# Patient Record
Sex: FEMALE | Race: WHITE | NOT HISPANIC OR LATINO | URBAN - METROPOLITAN AREA
[De-identification: names, ages, dates, MRNs, and addresses within clinical notes are randomized per-mention and may not be internally consistent; named-entity substitution may affect disease eponyms.]

---

## 2017-01-03 ENCOUNTER — OFFICE (AMBULATORY)
Dept: URBAN - METROPOLITAN AREA CLINIC 75 | Facility: CLINIC | Age: 37
End: 2017-01-03

## 2017-01-03 VITALS
HEART RATE: 80 BPM | SYSTOLIC BLOOD PRESSURE: 124 MMHG | HEIGHT: 67 IN | WEIGHT: 142 LBS | DIASTOLIC BLOOD PRESSURE: 80 MMHG

## 2017-01-03 DIAGNOSIS — Z86.010 PERSONAL HISTORY OF COLONIC POLYPS: ICD-10-CM

## 2017-01-03 DIAGNOSIS — R10.31 RIGHT LOWER QUADRANT PAIN: ICD-10-CM

## 2017-01-03 DIAGNOSIS — K51.00 ULCERATIVE (CHRONIC) PANCOLITIS WITHOUT COMPLICATIONS: ICD-10-CM

## 2017-01-03 DIAGNOSIS — K21.0 GASTRO-ESOPHAGEAL REFLUX DISEASE WITH ESOPHAGITIS: ICD-10-CM

## 2017-01-03 PROCEDURE — 99213 OFFICE O/P EST LOW 20 MIN: CPT | Performed by: INTERNAL MEDICINE

## 2017-01-03 RX ORDER — CHLORDIAZEPOXIDE HYDROCHLORIDE AND CLIDINIUM BROMIDE 5; 2.5 MG/1; MG/1
10 CAPSULE ORAL
Qty: 60 | Refills: 3 | Status: COMPLETED
Start: 2017-01-03 | End: 2017-03-17

## 2017-03-16 VITALS
HEIGHT: 67 IN | SYSTOLIC BLOOD PRESSURE: 116 MMHG | HEART RATE: 76 BPM | DIASTOLIC BLOOD PRESSURE: 70 MMHG | WEIGHT: 141 LBS

## 2017-03-17 ENCOUNTER — OFFICE (AMBULATORY)
Dept: URBAN - METROPOLITAN AREA CLINIC 1 | Facility: CLINIC | Age: 37
End: 2017-03-17

## 2017-03-17 DIAGNOSIS — K30 FUNCTIONAL DYSPEPSIA: ICD-10-CM

## 2017-03-17 DIAGNOSIS — Z86.010 PERSONAL HISTORY OF COLONIC POLYPS: ICD-10-CM

## 2017-03-17 DIAGNOSIS — R10.31 RIGHT LOWER QUADRANT PAIN: ICD-10-CM

## 2017-03-17 DIAGNOSIS — K62.89 OTHER SPECIFIED DISEASES OF ANUS AND RECTUM: ICD-10-CM

## 2017-03-17 DIAGNOSIS — K51.00 ULCERATIVE (CHRONIC) PANCOLITIS WITHOUT COMPLICATIONS: ICD-10-CM

## 2017-03-17 DIAGNOSIS — K92.2 GASTROINTESTINAL HEMORRHAGE, UNSPECIFIED: ICD-10-CM

## 2017-03-17 DIAGNOSIS — K21.0 GASTRO-ESOPHAGEAL REFLUX DISEASE WITH ESOPHAGITIS: ICD-10-CM

## 2017-03-17 PROCEDURE — 99213 OFFICE O/P EST LOW 20 MIN: CPT | Performed by: INTERNAL MEDICINE

## 2017-07-10 ENCOUNTER — OFFICE VISIT (OUTPATIENT)
Dept: SPORTS MEDICINE | Facility: CLINIC | Age: 37
End: 2017-07-10

## 2017-07-10 VITALS
BODY MASS INDEX: 21.12 KG/M2 | DIASTOLIC BLOOD PRESSURE: 70 MMHG | WEIGHT: 134.6 LBS | HEART RATE: 79 BPM | RESPIRATION RATE: 16 BRPM | TEMPERATURE: 98.7 F | HEIGHT: 67 IN | OXYGEN SATURATION: 99 % | SYSTOLIC BLOOD PRESSURE: 116 MMHG

## 2017-07-10 DIAGNOSIS — J02.9 SORE THROAT: Primary | ICD-10-CM

## 2017-07-10 DIAGNOSIS — J35.8 TONSIL STONE: ICD-10-CM

## 2017-07-10 LAB
EXPIRATION DATE: NORMAL
INTERNAL CONTROL: NORMAL
Lab: NORMAL
S PYO AG THROAT QL: NEGATIVE

## 2017-07-10 PROCEDURE — 87880 STREP A ASSAY W/OPTIC: CPT | Performed by: FAMILY MEDICINE

## 2017-07-10 PROCEDURE — 99213 OFFICE O/P EST LOW 20 MIN: CPT | Performed by: FAMILY MEDICINE

## 2017-07-10 RX ORDER — IBUPROFEN 600 MG/1
600 TABLET ORAL
COMMUNITY
End: 2022-05-31

## 2017-07-10 RX ORDER — CHLORAL HYDRATE 500 MG
CAPSULE ORAL
COMMUNITY
End: 2022-05-31

## 2017-07-10 NOTE — PROGRESS NOTES
"Lavonne is a 36 y.o. year old female    Chief Complaint   Patient presents with   • Sore Throat     Sore throat since Thursday night. White spot on tonsil.        History of Present Illness   HPI Comments: White bump on L tonsil since Thurs. No fever or chills. Some ear, neck pain but thinks it is swelling. No difficulty swallowing though painful. H/o similar that typically resolve w/in 24 hrs. No sick contacts.       I have reviewed the patient's medical history in detail and updated the computerized patient record.    Review of Systems   Constitutional: Negative for fever.   HENT:        Per HPI   Skin: Negative for rash.   All other systems reviewed and are negative.      /70 (BP Location: Left arm, Patient Position: Sitting, Cuff Size: Adult)  Pulse 79  Temp 98.7 °F (37.1 °C) (Oral)   Resp 16  Ht 67\" (170.2 cm)  Wt 134 lb 9.6 oz (61.1 kg)  LMP 06/17/2017  SpO2 99%  BMI 21.08 kg/m2     Physical Exam   Constitutional: She is oriented to person, place, and time. She appears well-developed and well-nourished.   HENT:   Head: Normocephalic and atraumatic.   Right Ear: External ear normal.   Left Ear: External ear normal.   Nose: Nose normal.   Mouth/Throat: Oropharynx is clear and moist.   L tonsillar stone w/in tonsil duct that is not painful to touch   Eyes: EOM are normal.   Neck: Normal range of motion.   Cardiovascular: Normal rate and regular rhythm.    Pulmonary/Chest: Effort normal and breath sounds normal.   Neurological: She is alert and oriented to person, place, and time.   Skin: Skin is warm and dry. No rash noted.   Psychiatric: She has a normal mood and affect. Her behavior is normal.   Nursing note and vitals reviewed.       Diagnoses and all orders for this visit:    Sore throat  -     POCT rapid strep A  -     Ambulatory Referral to ENT (Otolaryngology)    Tonsil stone  -     Ambulatory Referral to ENT (Otolaryngology)    Other orders  -     ibuprofen (ADVIL,MOTRIN) 600 MG tablet; Take " 600 mg by mouth.  -     MULTIPLE VITAMIN PO; Take  by mouth.  -     Omega-3 Fatty Acids (FISH OIL) 1000 MG capsule capsule; Take  by mouth.  -     PROBIOTIC PRODUCT PO; Take  by mouth.      RST neg. Tonsil stone likely. Can try acidic candy to try and help expectorate the stone. Salt water gargles. Given h/o what sounds like recurrent stones in same tonsil, recommend ENT consult.

## 2017-11-02 VITALS
HEIGHT: 67 IN | HEART RATE: 72 BPM | DIASTOLIC BLOOD PRESSURE: 80 MMHG | SYSTOLIC BLOOD PRESSURE: 114 MMHG | WEIGHT: 140 LBS

## 2017-11-03 ENCOUNTER — OFFICE (AMBULATORY)
Dept: URBAN - METROPOLITAN AREA CLINIC 1 | Facility: CLINIC | Age: 37
End: 2017-11-03

## 2017-11-03 DIAGNOSIS — K62.89 OTHER SPECIFIED DISEASES OF ANUS AND RECTUM: ICD-10-CM

## 2017-11-03 DIAGNOSIS — Z86.010 PERSONAL HISTORY OF COLONIC POLYPS: ICD-10-CM

## 2017-11-03 DIAGNOSIS — K30 FUNCTIONAL DYSPEPSIA: ICD-10-CM

## 2017-11-03 DIAGNOSIS — K51.00 ULCERATIVE (CHRONIC) PANCOLITIS WITHOUT COMPLICATIONS: ICD-10-CM

## 2017-11-03 DIAGNOSIS — R10.31 RIGHT LOWER QUADRANT PAIN: ICD-10-CM

## 2017-11-03 DIAGNOSIS — K21.0 GASTRO-ESOPHAGEAL REFLUX DISEASE WITH ESOPHAGITIS: ICD-10-CM

## 2017-11-03 PROCEDURE — 99213 OFFICE O/P EST LOW 20 MIN: CPT | Performed by: INTERNAL MEDICINE

## 2018-02-01 ENCOUNTER — OFFICE VISIT (OUTPATIENT)
Dept: SPORTS MEDICINE | Facility: CLINIC | Age: 38
End: 2018-02-01

## 2018-02-01 VITALS
SYSTOLIC BLOOD PRESSURE: 110 MMHG | HEIGHT: 67 IN | OXYGEN SATURATION: 99 % | WEIGHT: 141 LBS | HEART RATE: 108 BPM | TEMPERATURE: 98.6 F | DIASTOLIC BLOOD PRESSURE: 80 MMHG | BODY MASS INDEX: 22.13 KG/M2

## 2018-02-01 DIAGNOSIS — R68.89 FLU-LIKE SYMPTOMS: ICD-10-CM

## 2018-02-01 DIAGNOSIS — J01.10 ACUTE NON-RECURRENT FRONTAL SINUSITIS: Primary | ICD-10-CM

## 2018-02-01 LAB
EXPIRATION DATE: NORMAL
FLUAV AG NPH QL: NEGATIVE
FLUBV AG NPH QL: NEGATIVE
INTERNAL CONTROL: NORMAL
Lab: NORMAL

## 2018-02-01 PROCEDURE — 99213 OFFICE O/P EST LOW 20 MIN: CPT | Performed by: FAMILY MEDICINE

## 2018-02-01 PROCEDURE — 87804 INFLUENZA ASSAY W/OPTIC: CPT | Performed by: FAMILY MEDICINE

## 2018-02-01 RX ORDER — DOXYCYCLINE 100 MG/1
100 CAPSULE ORAL EVERY 12 HOURS SCHEDULED
Qty: 20 CAPSULE | Refills: 0 | Status: SHIPPED | OUTPATIENT
Start: 2018-02-01 | End: 2018-02-11

## 2018-02-01 RX ORDER — IPRATROPIUM BROMIDE 42 UG/1
2 SPRAY, METERED NASAL 3 TIMES DAILY PRN
Qty: 15 ML | Refills: 2 | Status: SHIPPED | OUTPATIENT
Start: 2018-02-01 | End: 2022-05-31

## 2018-02-01 NOTE — PROGRESS NOTES
"Lavonne is a 37 y.o. year old female    Chief Complaint   Patient presents with   • Cough     since monday   • Generalized Body Aches   • Headache       History of Present Illness   Cough   This is a new problem. The current episode started 1 to 4 weeks ago. The problem has been gradually worsening. Associated symptoms include ear congestion, a fever, headaches, myalgias, nasal congestion and postnasal drip. Pertinent negatives include no chest pain, chills, rash, rhinorrhea, sore throat or shortness of breath. She has tried OTC cough suppressant for the symptoms. The treatment provided mild relief.   Headache    Associated symptoms include coughing and a fever. Pertinent negatives include no abdominal pain, numbness, rhinorrhea, sinus pressure or sore throat.        I have reviewed the patient's medical, family, and social history in detail and updated the computerized patient record.    Review of Systems   Constitutional: Positive for fever. Negative for chills and fatigue.   HENT: Positive for postnasal drip. Negative for congestion, rhinorrhea, sinus pressure and sore throat.    Respiratory: Positive for cough. Negative for chest tightness and shortness of breath.    Cardiovascular: Negative for chest pain.   Gastrointestinal: Negative for abdominal pain.   Musculoskeletal: Positive for myalgias. Negative for arthralgias.   Skin: Negative for rash.   Neurological: Positive for headaches. Negative for numbness.   All other systems reviewed and are negative.      /80  Pulse 108  Temp 98.6 °F (37 °C)  Ht 170.2 cm (67\")  Wt 64 kg (141 lb)  SpO2 99%  BMI 22.08 kg/m2     Physical Exam   Constitutional: She is oriented to person, place, and time. She appears well-developed and well-nourished. She appears ill.   HENT:   Head: Normocephalic and atraumatic.   Right Ear: Hearing, tympanic membrane and external ear normal.   Left Ear: Hearing, tympanic membrane and external ear normal.   Nose: No mucosal edema or " rhinorrhea. Right sinus exhibits maxillary sinus tenderness. Left sinus exhibits maxillary sinus tenderness.   Mouth/Throat: Oropharynx is clear and moist. No posterior oropharyngeal erythema. Tonsils are 0 on the right. Tonsils are 0 on the left. No tonsillar exudate.   Eyes: Conjunctivae and EOM are normal.   Neck: Normal range of motion.   Cardiovascular: Normal rate and normal heart sounds.    Pulmonary/Chest: Effort normal and breath sounds normal.   Lymphadenopathy:     She has no cervical adenopathy.   Neurological: She is alert and oriented to person, place, and time.   Skin: Skin is warm and dry.   Psychiatric: She has a normal mood and affect. Her behavior is normal.   Nursing note and vitals reviewed.    Lab Results   Component Value Date    RAPFLUA NEGATIVE 02/01/2018    RAPFLUB NEGATIVE 02/01/2018          Diagnoses and all orders for this visit:    Acute non-recurrent frontal sinusitis  -     doxycycline (MONODOX) 100 MG capsule; Take 1 capsule by mouth Every 12 (Twelve) Hours for 10 days.  -     ipratropium (ATROVENT) 0.06 % nasal spray; 2 sprays into each nostril 3 (Three) Times a Day As Needed for Rhinitis.    Flu-like symptoms  -     POCT Influenza A/B             EMR Dragon/Transcription disclaimer:    Much of this encounter note is an electronic transcription/translation of spoken language to printed text.  The electronic translation of spoken language may permit erroneous, or at times, nonsensical words or phrases to be inadvertently transcribed.  Although I have reviewed the note for such errors some may still exist.

## 2018-03-13 ENCOUNTER — OFFICE VISIT (OUTPATIENT)
Dept: SPORTS MEDICINE | Facility: CLINIC | Age: 38
End: 2018-03-13

## 2018-03-13 VITALS
BODY MASS INDEX: 22.22 KG/M2 | HEIGHT: 67 IN | WEIGHT: 141.6 LBS | SYSTOLIC BLOOD PRESSURE: 122 MMHG | DIASTOLIC BLOOD PRESSURE: 72 MMHG

## 2018-03-13 DIAGNOSIS — M25.552 LEFT HIP PAIN: ICD-10-CM

## 2018-03-13 DIAGNOSIS — M76.899 ADDUCTOR TENDINITIS: Primary | ICD-10-CM

## 2018-03-13 PROCEDURE — 99214 OFFICE O/P EST MOD 30 MIN: CPT | Performed by: FAMILY MEDICINE

## 2018-03-13 RX ORDER — MESALAMINE 1.2 G/1
1200 TABLET, DELAYED RELEASE ORAL
COMMUNITY

## 2018-03-13 NOTE — PROGRESS NOTES
"Lavonne is a 37 y.o. year old female    Chief Complaint   Patient presents with   • Muscle Pain     LT Thigh       History of Present Illness  HPI     L hip pain: Onset 8 weeks ago with no inciting event.  There is very active and does body polyp classes which consist of low weight, high wraps.  She has noticed pain primarily on the inside of her left upper leg and is exacerbated with activity such as lunges.  Has tried activity modification, minimal rest, ice and even tried K taping on her own.  No radiating symptoms.    I have reviewed the patient's medical, family, and social history in detail and updated the computerized patient record.    Review of Systems   Constitutional: Negative for fever.   Musculoskeletal:        Per HPI   Skin: Negative for rash.   Neurological: Negative for weakness and numbness.   Psychiatric/Behavioral: Negative for sleep disturbance.   All other systems reviewed and are negative.      /72   Ht 170.2 cm (67\")   Wt 64.2 kg (141 lb 9.6 oz)   BMI 22.18 kg/m²      Physical Exam    Vital signs reviewed.   General: No acute distress.  Eyes: conjunctiva clear; pupils equally round and reactive  ENT: external ears and nose atraumatic; oropharynx clear  CV: no peripheral edema, 2+ distal pulses  Resp: normal respiratory effort, no use of accessory muscles  Skin: no rashes or wounds; normal turgor  Psych: mood and affect appropriate; recent and remote memory intact  Neuro: sensation to light touch intact    MSK Exam:  Ortho Exam    L-spine: No tenderness or warmth; negative straight leg raise bilateral; negative REBECCA bilateral; negative [place]enchfield  L hip: No warmth; tenderness along the adductor complex mid substance that is worsened with resisted hip adduction; full range of motion; negative logroll 5/5 resisted hip flexion with no pain at the hamstring origin;   R hip: No tenderness or warmth; full range of motion; negative logroll    Diagnoses and all orders for this " visit:    Adductor tendinitis  -     Ambulatory Referral to Physical Therapy Evaluate and treat    Left hip pain  -     Ambulatory Referral to Physical Therapy Evaluate and treat    Other orders  -     mesalamine (LIALDA) 1.2 g EC tablet; Take 1,200 mg by mouth Daily With Breakfast.      Discussed differential and likely diagnosis.  I recommend physical therapy to try to address any functional deficits.  Did discuss potential utility of dry needling to the area.  If she is not seeing significant gains in 4-6 weeks, follow-up for further evaluation.      EMR Dragon/Transcription disclaimer:    Much of this encounter note is an electronic transcription/translation of spoken language to printed text.  The electronic translation of spoken language may permit erroneous, or at times, nonsensical words or phrases to be inadvertently transcribed.  Although I have reviewed the note for such errors some may still exist.

## 2018-03-19 ENCOUNTER — TREATMENT (OUTPATIENT)
Dept: PHYSICAL THERAPY | Facility: CLINIC | Age: 38
End: 2018-03-19

## 2018-03-19 DIAGNOSIS — M76.899 ADDUCTOR TENDINITIS: ICD-10-CM

## 2018-03-19 DIAGNOSIS — M25.552 PAIN OF LEFT HIP JOINT: Primary | ICD-10-CM

## 2018-03-19 PROCEDURE — 97110 THERAPEUTIC EXERCISES: CPT | Performed by: PHYSICAL THERAPIST

## 2018-03-19 PROCEDURE — DRYNDL PR CUSTOM DRY NEEDLING SELF PAY: Performed by: PHYSICAL THERAPIST

## 2018-03-19 PROCEDURE — 97161 PT EVAL LOW COMPLEX 20 MIN: CPT | Performed by: PHYSICAL THERAPIST

## 2018-03-19 NOTE — PATIENT INSTRUCTIONS
Purpose of treatment  Dynamic stretching  Avoid sustained stretching  Dry needling purpose, risks, benefits, and side effects; consent reviewed, signed and to be scanned to Epic

## 2018-03-19 NOTE — PROGRESS NOTES
Physical Therapy Initial Evaluation and Plan of Care    TIME IN 1402 TIME OUT 1440  Patient: Lavonne Villafana   : 1980  Diagnosis/ICD-10 Code:  Pain of left hip joint [M25.552]  Referring practitioner: Vadim Pierson *    Subjective Evaluation    History of Present Illness  Date of onset: 2018  Mechanism of injury: Pt reports insidious onset of left hip pain about 8 weeks ago and trying to heal it on own.      Pt participates in [employer], Francesco, cycling and running  PMH: Left anterior knee pain medial and lateral to patella and underneath patella        Precautions and Work Restrictions: modifying home workouts and using ACE bandagePain  Current pain rating: 3  At best pain ratin  At worst pain ratin  Location: Left medial proximal and posterior thigh  Quality: dull ache and throbbing  Relieving factors: ice (wrapping thigh)  Aggravating factors: squatting (Francesco)  Progression: improved (with modification)    Diagnostic Tests  No diagnostic tests performed    Treatments  Treatments tried: Kinesiotape, ice.  Patient Goals  Patient goals for therapy: return to sport/leisure activities and decreased pain             Objective       Palpation   Left   Hypertonic in the adductor paulo, distal semimembranosus and distal semitendinosus.   Tenderness of the adductor paulo, distal semimembranosus and distal semitendinosus.     Strength/Myotome Testing     Left Hip   Normal muscle strength  Planes of Motion   Flexion: 4- (increased pain)  Extension: 4+  Abduction: 4+  Adduction: 4    Left Knee   Prone flexion: 4- (increased pain)    Tests     Left Hip   Negative REBECCA.   SLR: Negative.     Additional Tests Details  Resisted supine SLR and hip adduction (+) pain and weakness  Supine leg length (+) left longer  BILLIE adduction drop test L (-)    Functional Assessment   Squat   Pain. Not sitting toward left side, no left tibial anterior translation beyond toes, not sitting toward right side and no  "right tibial anterior translation beyond toes.     Single Leg Squat   Left Leg  Valgus.     Forward Step Down 6\"   Left Leg  Positive Trendelenburg.     Right Leg  Valgus.     Comments  Squat - with wide BRII and pain; decreased pain when feet brought to hip width           Assessment & Plan     Assessment  Impairments: impaired physical strength, lacks appropriate home exercise program and pain with function  Assessment details: Pt is active 37 y.o. Female who presents with chronic left thigh pain and signs and symptoms consistent with left adductor and hamstring strain.  Initiated dry needling today to decrease soft tissue tension and pain.   Also initiated dynamic stretching in treatment and for HEP.  Pt tolerated treatment session well without adverse effects.  Pt requires skilled physical therapy to address impairments and return to prior level of function including squatting and exercise activity without pain.  Prognosis: good  Functional Limitations: uncomfortable because of pain, stooping and unable to perform repetitive tasks  Goals  Plan Goals: Short-Term Goals - In 2 weeks:  1. Pt will be (I) with initial HEP.  2. Pt will perform bilateral squat with equal weight bearing and without pain.  3. Pt will participate in concentric hamstring and adductor strengthening without pain.    Long-Term Goals - In 4 weeks:  1. Left hamstring and hip adductor strength 5/5 without pain.  2. Pt will return to full participation in exercise classes without pain.  3. LEFS score 80/80 to demonstrate functional improvement.  4. Pt will perform single-leg squat on left without pain, contralateral pelvic drop or hip IR or adduction.    Plan  Therapy options: will be seen for skilled physical therapy services  Planned modality interventions: cryotherapy, electrical stimulation/Russian stimulation and thermotherapy (hydrocollator packs)  Planned therapy interventions: abdominal trunk stabilization, balance/weight-bearing training, " functional ROM exercises, home exercise program, joint mobilization, manual therapy, neuromuscular re-education, soft tissue mobilization, spinal/joint mobilization, strengthening, stretching and therapeutic activities  Frequency: 2x week  Duration in weeks: 4  Treatment plan discussed with: patient        Manual Therapy:    -     mins  37808;  Therapeutic Exercise:    10     mins  02729;     Neuromuscular Martha:    -    mins  71001;    Therapeutic Activity:     -     mins  15937;     Gait Training:      -     mins  27917;     Ultrasound:     -     mins  91688;    Electrical Stimulation:    -     mins  59335 ( );  Dry Needling     10     mins self-pay    Timed Treatment:   10   mins   Total Treatment:     38   mins    PT SIGNATURE: Jennifer Arevalo, PT, DPT   DATE TREATMENT INITIATED: 3/19/2018    Initial Certification  Certification Period: 6/17/2018  I certify that the therapy services are furnished while this patient is under my care.  The services outlined above are required by this patient, and will be reviewed every 90 days.     PHYSICIAN: Vadim Pierson Jr., DO      DATE:     Please sign and return via fax to [phone]. Thank you, [facility].

## 2018-03-22 ENCOUNTER — TREATMENT (OUTPATIENT)
Dept: PHYSICAL THERAPY | Facility: CLINIC | Age: 38
End: 2018-03-22

## 2018-03-22 DIAGNOSIS — M76.899 ADDUCTOR TENDINITIS: ICD-10-CM

## 2018-03-22 DIAGNOSIS — M25.552 PAIN OF LEFT HIP JOINT: Primary | ICD-10-CM

## 2018-03-22 PROCEDURE — DRYNDL PR CUSTOM DRY NEEDLING SELF PAY: Performed by: PHYSICAL THERAPIST

## 2018-03-22 PROCEDURE — G0283 ELEC STIM OTHER THAN WOUND: HCPCS | Performed by: PHYSICAL THERAPIST

## 2018-03-22 PROCEDURE — 97140 MANUAL THERAPY 1/> REGIONS: CPT | Performed by: PHYSICAL THERAPIST

## 2018-03-22 PROCEDURE — 97110 THERAPEUTIC EXERCISES: CPT | Performed by: PHYSICAL THERAPIST

## 2018-03-22 NOTE — PROGRESS NOTES
Physical Therapy Daily Progress Note    Time In 1401  Time Out 1500    Lavonne Villafana reports: soreness the day of last treatment, but felt less pain during household activities the next day.     Subjective     Objective       Palpation   Left   Tenderness of the adductor pualo, distal semimembranosus and distal semitendinosus.      See Exercise, Manual, and Modality Logs for complete treatment.       Assessment & Plan     Assessment  Assessment details: Continued dry needling treatment due to favorable response of decreased pain after last treatment.  Initiated instrument assisted soft tissue massage to promote pain-free mobility.  Also initiated frontal plane strengthening and some isometric hip adduction concentric hamstring exercises.  Pt reported hip muscle fatigue but no increased pain.       Progress strengthening /stabilization /functional activity           Manual Therapy:    10     mins  63720;  Therapeutic Exercise:    20     mins  00688;     Neuromuscular Martha:    -    mins  32296;    Therapeutic Activity:     -     mins  52411;     Gait Training:      -     mins  18526;     Ultrasound:     -     mins  43326;    Electrical Stimulation:    15     mins  96647 ( );  Dry Needling     10     mins self-pay    Timed Treatment:   30   mins   Total Treatment:     59   mins    Jennifer Arevalo, PT, DPT  Physical Therapist

## 2018-03-22 NOTE — PATIENT INSTRUCTIONS
Purpose of treatment  Pathology and involved anatomy  Appropriate response to treatment including possible bruising with instrument assisted soft tissue massage

## 2018-03-27 ENCOUNTER — TREATMENT (OUTPATIENT)
Dept: PHYSICAL THERAPY | Facility: CLINIC | Age: 38
End: 2018-03-27

## 2018-03-27 DIAGNOSIS — M76.899 ADDUCTOR TENDINITIS: ICD-10-CM

## 2018-03-27 DIAGNOSIS — M25.552 PAIN OF LEFT HIP JOINT: Primary | ICD-10-CM

## 2018-03-27 PROCEDURE — DRYNDL PR CUSTOM DRY NEEDLING SELF PAY: Performed by: PHYSICAL THERAPIST

## 2018-03-27 PROCEDURE — 97110 THERAPEUTIC EXERCISES: CPT | Performed by: PHYSICAL THERAPIST

## 2018-03-27 PROCEDURE — G0283 ELEC STIM OTHER THAN WOUND: HCPCS | Performed by: PHYSICAL THERAPIST

## 2018-03-27 PROCEDURE — 97140 MANUAL THERAPY 1/> REGIONS: CPT | Performed by: PHYSICAL THERAPIST

## 2018-03-27 NOTE — PROGRESS NOTES
Physical Therapy Daily Progress Note    Time In 1200  Time Out 1300    Terra Broderick reports: increased pain after workout on Monday very localized pain in left posterior upper thigh.     Subjective     Objective       Palpation     Right   Hypertonic in the proximal semimembranosus and proximal semitendinosus. Tenderness of the proximal semimembranosus and proximal semitendinosus.      See Exercise, Manual, and Modality Logs for complete treatment.       Assessment & Plan     Assessment  Assessment details: Progressed bridge activity to include alternating knee extension.  Also progressed to concentric hip adduction exercise with [place] today without pain.  Pt tolerated treatment without hamstring or hip adductor pain.  Pt reported hip muscle fatigue during treatment, but able to complete full sets of exercises without compensation.         Progress strengthening /stabilization /functional activity           Manual Therapy:    8     mins  41153;  Therapeutic Exercise:    27     mins  12671;     Neuromuscular Martha:    -    mins  81726;    Therapeutic Activity:     -     mins  07177;     Gait Training:      -     mins  59017;     Ultrasound:     -     mins  95112;    Electrical Stimulation:    15     mins  98293 ( );  Dry Needling     10     mins self-pay    Timed Treatment:   35   mins   Total Treatment:     60   mins    Jennifer Arevalo, PT, DPT  Physical Therapist

## 2018-04-12 ENCOUNTER — TREATMENT (OUTPATIENT)
Dept: PHYSICAL THERAPY | Facility: CLINIC | Age: 38
End: 2018-04-12

## 2018-04-12 DIAGNOSIS — M76.899 ADDUCTOR TENDINITIS: ICD-10-CM

## 2018-04-12 DIAGNOSIS — M25.552 PAIN OF LEFT HIP JOINT: Primary | ICD-10-CM

## 2018-04-12 PROCEDURE — G0283 ELEC STIM OTHER THAN WOUND: HCPCS | Performed by: PHYSICAL THERAPIST

## 2018-04-12 PROCEDURE — DRYNDL PR CUSTOM DRY NEEDLING SELF PAY: Performed by: PHYSICAL THERAPIST

## 2018-04-12 PROCEDURE — 97140 MANUAL THERAPY 1/> REGIONS: CPT | Performed by: PHYSICAL THERAPIST

## 2018-04-12 NOTE — PROGRESS NOTES
Physical Therapy Daily Progress Note    Time In 1200  Time Out 1245    Lavonne Villafana reports: some improvement in hamstring while on vacation.  Pt reports increased left hamstring pain when bending down to cut poster board this week.  Pt reports bilateral thigh and hamstring pain when performing body weight squat earlier this week.  Pt reports not feeling well and very fatigued today due to colon issues and not eating or drinking.     Subjective     Objective       Palpation   Left   Hypertonic in the proximal semimembranosus and proximal semitendinosus.   Tenderness of the adductor paulo, proximal semimembranosus and proximal semitendinosus.     Right   Hypertonic in the proximal semimembranosus and proximal semitendinosus. Tenderness of the adductor paulo, proximal semimembranosus and proximal semitendinosus.      See Exercise, Manual, and Modality Logs for complete treatment.       Assessment & Plan     Assessment  Assessment details: Pt reported fatigue today due to alternate medical condition.  Treatment session focused on decreasing soft tissue tension and pain for improved pain-free mobility and function.  Pt tolerated treatment well with decreased tone in bilateral hamstrings post-dry needling.        Progress per Plan of Care           Manual Therapy:    15     mins  69482;  Therapeutic Exercise:    -     mins  77476;     Neuromuscular Martha:    -    mins  65572;    Therapeutic Activity:     -     mins  79767;     Gait Training:      -     mins  06068;     Ultrasound:     -     mins  41891;    Electrical Stimulation:    15     mins  45579 ( );  Dry Needling     12     mins self-pay    Timed Treatment:   15   mins   Total Treatment:     45   mins    Jennifer Arevalo, PT, DPT  Physical Therapist

## 2018-04-17 ENCOUNTER — TREATMENT (OUTPATIENT)
Dept: PHYSICAL THERAPY | Facility: CLINIC | Age: 38
End: 2018-04-17

## 2018-04-17 DIAGNOSIS — M25.552 PAIN OF LEFT HIP JOINT: Primary | ICD-10-CM

## 2018-04-17 DIAGNOSIS — M76.899 ADDUCTOR TENDINITIS: ICD-10-CM

## 2018-04-17 PROCEDURE — 97140 MANUAL THERAPY 1/> REGIONS: CPT | Performed by: PHYSICAL THERAPIST

## 2018-04-17 PROCEDURE — DRYNDL PR CUSTOM DRY NEEDLING SELF PAY: Performed by: PHYSICAL THERAPIST

## 2018-04-17 PROCEDURE — 97110 THERAPEUTIC EXERCISES: CPT | Performed by: PHYSICAL THERAPIST

## 2018-04-17 PROCEDURE — G0283 ELEC STIM OTHER THAN WOUND: HCPCS | Performed by: PHYSICAL THERAPIST

## 2018-04-17 NOTE — PROGRESS NOTES
Physical Therapy Daily Progress Note    Time In 1204  Time Out 1300    Lavonne Villafana reports: doing a lot better after last treatment and able to squat with 2/3 the weight and go down 3/4 into squat. Pt reports sitting on heating pad a night.  Pt reported some pain in medial thighs with single-leg bridges yesterday and performed bilaterally with a weight on unilateral hip.    Subjective     Objective       Palpation   Left   Hypertonic in the adductor paulo.   Tenderness of the adductor paulo.     Right   Hypertonic in the adductor paulo. Tenderness of the adductor paulo.      See Exercise, Manual, and Modality Logs for complete treatment.       Assessment & Plan     Assessment  Assessment details: Pt demonstrates improved symptoms and increased tolerance to LE strengthening.  Pt is progressing well under current treatment plan and continued manual therapy including dry needling to bilateral LEs due to favorable response last treatment.  Pt required mild verbal cues for trunk control/stabilization and posterior weight shift to avoid anterior translation of tibia during CKC exercises.       Progress per Plan of Care           Manual Therapy:    10     mins  56128;  Therapeutic Exercise:    20     mins  43804;     Neuromuscular Martha:    -    mins  30727;    Therapeutic Activity:     -     mins  92453;     Gait Training:      -     mins  84567;     Ultrasound:     -     mins  10113;    Electrical Stimulation:    15     mins  22028 ( );  Dry Needling     8     mins self-pay    Timed Treatment:   30   mins   Total Treatment:     56   mins    Jennifer Arevalo, PT, DPT  Physical Therapist

## 2018-04-19 ENCOUNTER — TREATMENT (OUTPATIENT)
Dept: PHYSICAL THERAPY | Facility: CLINIC | Age: 38
End: 2018-04-19

## 2018-04-19 DIAGNOSIS — M25.552 PAIN OF LEFT HIP JOINT: Primary | ICD-10-CM

## 2018-04-19 DIAGNOSIS — M76.899 ADDUCTOR TENDINITIS: ICD-10-CM

## 2018-04-19 PROCEDURE — G0283 ELEC STIM OTHER THAN WOUND: HCPCS | Performed by: PHYSICAL THERAPIST

## 2018-04-19 PROCEDURE — DRYNDL PR CUSTOM DRY NEEDLING SELF PAY: Performed by: PHYSICAL THERAPIST

## 2018-04-19 PROCEDURE — 97140 MANUAL THERAPY 1/> REGIONS: CPT | Performed by: PHYSICAL THERAPIST

## 2018-04-19 PROCEDURE — 97110 THERAPEUTIC EXERCISES: CPT | Performed by: PHYSICAL THERAPIST

## 2018-04-19 NOTE — PROGRESS NOTES
Physical Therapy Daily Progress Note    Time In 1158  Time Out 1258    Lavonne Villafana reports: PT reports lunge track in [employer] has been really hard and left quadriceps is so sore it hurts.     Subjective     Objective       Palpation   Left   Hypertonic in the vastus lateralis.   Tenderness of the vastus lateralis.      See Exercise, Manual, and Modality Logs for complete treatment.       Assessment & Plan     Assessment  Assessment details: Initiated dry needling to left quadriceps today due to c/o pain and noted increased tone and TTP.  Treatment session focused on decreasing soft tissue tension and improving pain-free mobility.  Pt is expected to continue to improve with dynamic stretching in HEP in addition to manual treatments.         Progress per Plan of Care           Manual Therapy:    15     mins  22069;  Therapeutic Exercise:    20     mins  95508;     Neuromuscular Martha:    -    mins  59794;    Therapeutic Activity:     -     mins  15802;     Gait Training:      -     mins  90654;     Ultrasound:     -     mins  02106;    Electrical Stimulation:    15     mins  22334 ( );  Dry Needling     10     mins self-pay    Timed Treatment:   35   mins   Total Treatment:     60   mins    Jennifer Arevalo, PT, DPT  Physical Therapist

## 2018-04-26 ENCOUNTER — TREATMENT (OUTPATIENT)
Dept: PHYSICAL THERAPY | Facility: CLINIC | Age: 38
End: 2018-04-26

## 2018-04-26 DIAGNOSIS — M25.552 PAIN OF LEFT HIP JOINT: Primary | ICD-10-CM

## 2018-04-26 DIAGNOSIS — M76.899 ADDUCTOR TENDINITIS: ICD-10-CM

## 2018-04-26 PROCEDURE — DRYNDL PR CUSTOM DRY NEEDLING SELF PAY: Performed by: PHYSICAL THERAPIST

## 2018-04-26 PROCEDURE — 97110 THERAPEUTIC EXERCISES: CPT | Performed by: PHYSICAL THERAPIST

## 2018-04-26 PROCEDURE — G0283 ELEC STIM OTHER THAN WOUND: HCPCS | Performed by: PHYSICAL THERAPIST

## 2018-04-26 PROCEDURE — 97140 MANUAL THERAPY 1/> REGIONS: CPT | Performed by: PHYSICAL THERAPIST

## 2018-04-26 NOTE — PROGRESS NOTES
"Physical Therapy Daily Progress Note    Time In 1200  Time Out 1259    Terra Broderick reports: improvement in quadriceps symptoms and hamstrings just feel \"tight\" and some tension in left adductor mass.  Pt reports lifting with increased weight but not quite at full weight able to perform previously.     Subjective     Objective       Palpation   Left   Hypertonic in the adductor paulo, distal biceps femoris, distal semimembranosus and distal semitendinosus.   Tenderness of the adductor paulo.   Trigger point to adductor paulo.     Right   Hypertonic in the distal semimembranosus and distal semitendinosus. Tenderness of the distal semimembranosus and distal semitendinosus.   Trigger point to distal semimembranosus and distal semitendinosus.      See Exercise, Manual, and Modality Logs for complete treatment.       Assessment & Plan     Assessment  Assessment details: Pt demonstrates improved symptoms and dry needling and instrument assisted massage decreased locations and dosage due to improvements.  Progressed hip strengthening and control exercises today with mild difficulty and loss of balance but no increased pain.  Initiated eccentric hamstring exercise today with reports of \"tension\" in distal hamstrings but no pain or symptoms in mid muscle belly where pt usually experiences pain.        Progress strengthening /stabilization /functional activity  Re-assessment to be performed next visit         Manual Therapy:    10     mins  18709;  Therapeutic Exercise:    20     mins  29715;     Neuromuscular Martha:    -    mins  62742;    Therapeutic Activity:     --     mins  97823;     Gait Training:      -     mins  01808;     Ultrasound:     -     mins  80375;    Electrical Stimulation:    15     mins  78958 ( );  Dry Needling     10     mins self-pay    Timed Treatment:   30   mins   Total Treatment:      59  mins    Jennifer Arevalo, PT, DPT  Physical Therapist    "

## 2018-05-01 ENCOUNTER — TREATMENT (OUTPATIENT)
Dept: PHYSICAL THERAPY | Facility: CLINIC | Age: 38
End: 2018-05-01

## 2018-05-01 DIAGNOSIS — M76.899 ADDUCTOR TENDINITIS: ICD-10-CM

## 2018-05-01 DIAGNOSIS — M25.552 PAIN OF LEFT HIP JOINT: Primary | ICD-10-CM

## 2018-05-01 PROCEDURE — 97110 THERAPEUTIC EXERCISES: CPT | Performed by: PHYSICAL THERAPIST

## 2018-05-01 PROCEDURE — G0283 ELEC STIM OTHER THAN WOUND: HCPCS | Performed by: PHYSICAL THERAPIST

## 2018-05-01 PROCEDURE — DRYNDL PR CUSTOM DRY NEEDLING SELF PAY: Performed by: PHYSICAL THERAPIST

## 2018-05-01 NOTE — PROGRESS NOTES
"Re-Assessment / Re-Certification    Time In 1132     Time Out 1225    Patient: Lavonne Villafana   : 1980  Diagnosis/ICD-10 Code:  Pain of left hip joint [M25.552]  Referring practitioner: Vadim Pierson *  Date of Initial Visit: 2018  Today's Date: 2018  Patient seen for 8 sessions      Subjective:   Lavonne Villafana reports: doing much better and only about some tightness in left middle posterior to medial thigh.  Pt reports ability to perform squats with only 5-7lb less than previous norm and reports increased depth of squat.   Subjective Questionnaire: LEFS: 72/80  Clinical Progress: improved  Home Program Compliance: Yes  Treatment has included: therapeutic exercise, neuromuscular re-education, manual therapy, electrical stimulation, dry needling, moist heat and cryotherapy    Subjective   Objective       Strength/Myotome Testing     Left Hip   Planes of Motion   Extension: 4  Adduction: 4 (soreness)    Right Hip   Planes of Motion   Extension: 4+  Adduction: 4+    Left Knee   Prone flexion: 4+    Right Knee   Prone flexion: 4+    Functional Assessment   Squat No pain, no trunk lean left and no trunk lean right.     Single Leg Squat   Left Leg  No pain, negative Trendelenburg and no valgus.     Right Leg  No pain and no valgus.      Assessment & Plan     Assessment  Impairments: impaired physical strength and pain with function  Assessment details: Pt demonstrates improves symptoms, strength, and tolerance to activity.  Pt continues to report \"tightness\" and demonstrates weakness in bilateral hamstrings and hip adductors.  Pt demonstrates improved LE and lumbopelvic neuromuscular control and has met goals related to neuromuscular cotnrol during bilateral and single-leg squat.  Decreased dose and locations of dry needling and soft tissue massage today due to progress towards goal and only complaint of left hamstring \"tightness\".  Pt requires continued skilled physical therapy to address " impairments and continue to progress to prior level of function including getting into car and full participation in exercise classes without pain.   Prognosis: good  Functional Limitations: uncomfortable because of pain  Plan  Therapy options: will be seen for skilled physical therapy services  Planned modality interventions: cryotherapy, electrical stimulation/Russian stimulation and thermotherapy (hydrocollator packs)  Planned therapy interventions: abdominal trunk stabilization, balance/weight-bearing training, body mechanics training, home exercise program, joint mobilization, manual therapy, neuromuscular re-education, soft tissue mobilization, strengthening, stretching and therapeutic activities  Frequency: 2x week  Duration in weeks: 2  Treatment plan discussed with: patient      Progress toward previous goals: Partially Met     New Goals  Short-term goals (STG): In 2 weeks:  1.  Pt will participate in concentric hamstring and adductor strengthening without pain.  2.  Pt will participate fully in Body Pump class with same weight as previously performed and without pain.   3. Bilateral knee flexion, hip extension and hip adduction strength   4. LEFS score 80/80 to demonstrate functional improvement.        Recommendations: Continue as planned  Timeframe: 2 weeks  Prognosis to achieve goals: good    PT Signature: Jennifer Arevalo, PT, DPT      Based upon review of the patient's progress and continued therapy plan, it is my medical opinion that Lavonne Villafana should continue physical therapy treatment at [facility]  [address] [address] [address] [phone].    Signature: __________________________________  Vadim Pierson Jr., DO    Manual Therapy:    -     mins  92340;  Therapeutic Exercise:    28     mins  93389;     Neuromuscular Martha:    -    mins  98721;    Therapeutic Activity:     -     mins  03699;     Gait  Training:      -     mins  96791;     Ultrasound:     -     mins  60754;    Electrical Stimulation:    15     mins  39982 ( );  Dry Needling     8     mins self-pay    Timed Treatment:   28   mins   Total Treatment:     53   mins    Please sign and return via fax to [phone]. Thank you, [facility].

## 2018-05-03 ENCOUNTER — TREATMENT (OUTPATIENT)
Dept: PHYSICAL THERAPY | Facility: CLINIC | Age: 38
End: 2018-05-03

## 2018-05-03 DIAGNOSIS — M76.899 ADDUCTOR TENDINITIS: ICD-10-CM

## 2018-05-03 DIAGNOSIS — M25.552 PAIN OF LEFT HIP JOINT: Primary | ICD-10-CM

## 2018-05-03 PROCEDURE — DRYNDL PR CUSTOM DRY NEEDLING SELF PAY: Performed by: PHYSICAL THERAPIST

## 2018-05-03 PROCEDURE — G0283 ELEC STIM OTHER THAN WOUND: HCPCS | Performed by: PHYSICAL THERAPIST

## 2018-05-03 PROCEDURE — 97110 THERAPEUTIC EXERCISES: CPT | Performed by: PHYSICAL THERAPIST

## 2018-05-03 NOTE — PROGRESS NOTES
"Physical Therapy Daily Progress Note    Time In 1435  Time Out 1539    Lavonne Villafana reports: yesterday was not as good in class and not able to go as deep in squats.  Pt reports both hamstrings feel \"tight\" today.     Subjective     Objective       Functional Assessment     Forward Step Down 6\"   Left Leg  No pain, negative Trendelenburg and no valgus.     Right Leg  No pain, negative Trendelenburg and no valgus.      See Exercise, Manual, and Modality Logs for complete treatment.       Assessment & Plan     Assessment  Assessment details: Pt demonstrates improved LE neuromuscular control during step down, but demonstrated loss of balance 25% of time during step-down activity.  Re-initiated eccentric and concentric hamstring strengthening today without increased pain or difficulty.  Pt reported some improvement after dynamic hamstring activities.         Progress strengthening /stabilization /functional activity           Manual Therapy:    8     mins  58031;  Therapeutic Exercise:    27     mins  71019;     Neuromuscular Martha:    -    mins  26745;    Therapeutic Activity:     -     mins  29537;     Gait Training:      -     mins  98284;     Ultrasound:     -     mins  32254;    Electrical Stimulation:    15     mins  37419 ( );  Dry Needling     10     mins self-pay    Timed Treatment:   35   mins   Total Treatment:     64   mins    Jennifer Arevalo, PT, DPT  Physical Therapist    "

## 2018-05-08 ENCOUNTER — TREATMENT (OUTPATIENT)
Dept: PHYSICAL THERAPY | Facility: CLINIC | Age: 38
End: 2018-05-08

## 2018-05-08 DIAGNOSIS — M76.899 ADDUCTOR TENDINITIS: ICD-10-CM

## 2018-05-08 DIAGNOSIS — M25.552 PAIN OF LEFT HIP JOINT: Primary | ICD-10-CM

## 2018-05-08 PROCEDURE — 97140 MANUAL THERAPY 1/> REGIONS: CPT | Performed by: PHYSICAL THERAPIST

## 2018-05-08 PROCEDURE — G0283 ELEC STIM OTHER THAN WOUND: HCPCS | Performed by: PHYSICAL THERAPIST

## 2018-05-08 PROCEDURE — 97110 THERAPEUTIC EXERCISES: CPT | Performed by: PHYSICAL THERAPIST

## 2018-05-08 NOTE — PROGRESS NOTES
"Physical Therapy Daily Progress Note    Time In 1135  Time Out 1225    Lavonne Villafana reports: this weekend performed squats with full weight and 95% depth.  Pt reports leg does not feel \"injured\" anymore.     Subjective     Objective       Palpation     Right   No palpable tenderness to the adductor brevis, adductor longus and adductor paulo.      See Exercise, Manual, and Modality Logs for complete treatment.       Assessment & Plan     Assessment  Assessment details: No soft tissue restrictions noted in left adductor mass in during manual instrument assisted massage.  Pt demonstrates improved function, symptoms and strength related to hamstring and adductor muscle strains.  Pt demonstrates excellent compliance with HEP.  Pt is scheduled for 1 more PT visit to ensure ability to maintain symptom-free exercise activity and progress to (I) management and (I) with HEP performance.         Anticipate DC next Visit           Manual Therapy:    10    mins  00327;  Therapeutic Exercise:    25     mins  04423;     Neuromuscular Martha:    -    mins  04311;    Therapeutic Activity:     -     mins  17064;     Gait Training:      -     mins  79229;     Ultrasound:     -     mins  95232;    Electrical Stimulation:    15     mins  84817 ( );  Dry Needling     -     mins self-pay    Timed Treatment:   35   mins   Total Treatment:     50   mins    Jennifer Arevalo, PT, DPT  Physical Therapist    "

## 2018-05-15 ENCOUNTER — TREATMENT (OUTPATIENT)
Dept: PHYSICAL THERAPY | Facility: CLINIC | Age: 38
End: 2018-05-15

## 2018-05-15 DIAGNOSIS — M76.899 ADDUCTOR TENDINITIS: ICD-10-CM

## 2018-05-15 DIAGNOSIS — M25.552 PAIN OF LEFT HIP JOINT: Primary | ICD-10-CM

## 2018-05-15 PROCEDURE — 97140 MANUAL THERAPY 1/> REGIONS: CPT | Performed by: PHYSICAL THERAPIST

## 2018-05-15 PROCEDURE — DRYNDL PR CUSTOM DRY NEEDLING SELF PAY: Performed by: PHYSICAL THERAPIST

## 2018-05-15 PROCEDURE — 97112 NEUROMUSCULAR REEDUCATION: CPT | Performed by: PHYSICAL THERAPIST

## 2018-05-15 NOTE — PROGRESS NOTES
Physical Therapy Daily Progress Note    Time In 1401  Time Out 1457    Lavonne Villafana reports: doing ok, but some soreness continues in left hamstring but not as bad.  Pt reports pain at proximal left hamstring attachment.     Subjective     Objective       Tests     Additional Tests Details  Supine leg length (+) left longer  BILLIE adduction drop test R (-) L (+)     See Exercise, Manual, and Modality Logs for complete treatment.       Assessment & Plan     Assessment  Assessment details: Pt demonstrates improvement in LE symptoms and tolerance to activity but continues to have intermittent left hamstring and adductor pain.  Assessment of pelvis position addressed today due to continued L LE symptoms.  Pt responded well to BILLIE re-positioning and ZOA exercise with improved hip mobility and negative adduction drop test post-treatment.  Pt instructed on BILLIE exercise and breathing for home.         Progress per Plan of Care           Manual Therapy:    10     mins  16672;  Therapeutic Exercise:    -     mins  45193;     Neuromuscular Martha:    15    mins  73499;    Therapeutic Activity:     --     mins  62800;     Gait Training:      -     mins  26647;     Ultrasound:     -     mins  43513;    Electrical Stimulation:    15     mins  87473 ( );  Dry Needling     8     mins self-pay    Timed Treatment:  25    mins   Total Treatment:     56   mins    Jennifer Arevalo, PT, DPT  Physical Therapist

## 2018-05-17 VITALS
WEIGHT: 141 LBS | SYSTOLIC BLOOD PRESSURE: 116 MMHG | DIASTOLIC BLOOD PRESSURE: 68 MMHG | HEART RATE: 80 BPM | HEIGHT: 67 IN

## 2018-05-18 ENCOUNTER — OFFICE (AMBULATORY)
Dept: URBAN - METROPOLITAN AREA CLINIC 1 | Facility: CLINIC | Age: 38
End: 2018-05-18

## 2018-05-18 DIAGNOSIS — Z86.010 PERSONAL HISTORY OF COLONIC POLYPS: ICD-10-CM

## 2018-05-18 DIAGNOSIS — K62.89 OTHER SPECIFIED DISEASES OF ANUS AND RECTUM: ICD-10-CM

## 2018-05-18 DIAGNOSIS — K30 FUNCTIONAL DYSPEPSIA: ICD-10-CM

## 2018-05-18 DIAGNOSIS — R10.31 RIGHT LOWER QUADRANT PAIN: ICD-10-CM

## 2018-05-18 DIAGNOSIS — K51.00 ULCERATIVE (CHRONIC) PANCOLITIS WITHOUT COMPLICATIONS: ICD-10-CM

## 2018-05-18 DIAGNOSIS — K21.0 GASTRO-ESOPHAGEAL REFLUX DISEASE WITH ESOPHAGITIS: ICD-10-CM

## 2018-05-18 PROCEDURE — 99213 OFFICE O/P EST LOW 20 MIN: CPT | Performed by: INTERNAL MEDICINE

## 2018-05-18 RX ORDER — MESALAMINE 1000 MG/1
1 SUPPOSITORY RECTAL
Qty: 28 | Refills: 3 | Status: COMPLETED
End: 2018-11-16

## 2018-05-22 ENCOUNTER — TREATMENT (OUTPATIENT)
Dept: PHYSICAL THERAPY | Facility: CLINIC | Age: 38
End: 2018-05-22

## 2018-05-22 DIAGNOSIS — M76.899 ADDUCTOR TENDINITIS: ICD-10-CM

## 2018-05-22 DIAGNOSIS — M25.552 PAIN OF LEFT HIP JOINT: Primary | ICD-10-CM

## 2018-05-22 PROCEDURE — G0283 ELEC STIM OTHER THAN WOUND: HCPCS | Performed by: PHYSICAL THERAPIST

## 2018-05-22 PROCEDURE — 97140 MANUAL THERAPY 1/> REGIONS: CPT | Performed by: PHYSICAL THERAPIST

## 2018-05-22 PROCEDURE — 97110 THERAPEUTIC EXERCISES: CPT | Performed by: PHYSICAL THERAPIST

## 2018-05-22 PROCEDURE — DRYNDL PR CUSTOM DRY NEEDLING SELF PAY: Performed by: PHYSICAL THERAPIST

## 2018-05-22 NOTE — PATIENT INSTRUCTIONS
Hip adduction machine to tolerance with light weight and high reps  HEP performance  Handout given for BILLIE exercise

## 2018-05-22 NOTE — PROGRESS NOTES
Physical Therapy Daily Progress Note    Time In 0936  Time Out 1025    Lavonne Villafana reports: fell into trunk on Friday and felt increased medial thigh pain after but that has improved.  Pt reports pain is not worse.  Pt reports greatest tightness is still in left medial hamstring.      Subjective     Objective       Palpation   Left   Hypertonic in the distal semimembranosus and distal semitendinosus.   Tenderness of the distal semimembranosus and distal semitendinosus.     Tests     Additional Tests Details  BILLIE adduction drop test R (-) and L (+)      See Exercise, Manual, and Modality Logs for complete treatment.       Assessment & Plan     Assessment  Assessment details: Pt responded well to BILLIE hemibridge with (-) adduction drop test post-re-positioning.  Minimal restrictions noted in left adductor mass during manual therapy.  Pt is progressing well towards goals and may require additional dry needling or instrument assisted massage to manage soft tissue tension and pain.          Progress per Plan of Care           Manual Therapy:    10     mins  66436;  Therapeutic Exercise:    14     mins  01988;     Neuromuscular Martha:    -    mins  98053;    Therapeutic Activity:     -     mins  23141;     Gait Training:      -     mins  17573;     Ultrasound:     -     mins  67613;    Electrical Stimulation:    15     mins  09051 ( );  Dry Needling     10     mins self-pay    Timed Treatment:   24   mins   Total Treatment:     49   mins    Jennifer Arevalo, PT, DPT  Physical Therapist

## 2018-09-10 DIAGNOSIS — Z13.220 SCREENING CHOLESTEROL LEVEL: ICD-10-CM

## 2018-09-10 DIAGNOSIS — Z00.00 ANNUAL PHYSICAL EXAM: Primary | ICD-10-CM

## 2018-09-11 DIAGNOSIS — L65.9 HAIR LOSS: Primary | ICD-10-CM

## 2018-09-11 LAB
ALBUMIN SERPL-MCNC: 4.3 G/DL (ref 3.5–5.2)
ALBUMIN/GLOB SERPL: 1.4 G/DL
ALP SERPL-CCNC: 58 U/L (ref 39–117)
ALT SERPL-CCNC: 11 U/L (ref 1–33)
AST SERPL-CCNC: 15 U/L (ref 1–32)
BILIRUB SERPL-MCNC: 0.4 MG/DL (ref 0.1–1.2)
BUN SERPL-MCNC: 10 MG/DL (ref 6–20)
BUN/CREAT SERPL: 12.7 (ref 7–25)
CALCIUM SERPL-MCNC: 9.5 MG/DL (ref 8.6–10.5)
CHLORIDE SERPL-SCNC: 103 MMOL/L (ref 98–107)
CHOLEST SERPL-MCNC: 152 MG/DL (ref 0–200)
CHOLEST/HDLC SERPL: 3.45 {RATIO}
CO2 SERPL-SCNC: 26.5 MMOL/L (ref 22–29)
CREAT SERPL-MCNC: 0.79 MG/DL (ref 0.57–1)
GLOBULIN SER CALC-MCNC: 3 GM/DL
GLUCOSE SERPL-MCNC: 86 MG/DL (ref 65–99)
HDLC SERPL-MCNC: 44 MG/DL (ref 40–60)
LDLC SERPL CALC-MCNC: 95 MG/DL (ref 0–100)
POTASSIUM SERPL-SCNC: 4.7 MMOL/L (ref 3.5–5.2)
PROT SERPL-MCNC: 7.3 G/DL (ref 6–8.5)
SODIUM SERPL-SCNC: 141 MMOL/L (ref 136–145)
T4 FREE SERPL-MCNC: 1.05 NG/DL (ref 0.93–1.7)
TRIGL SERPL-MCNC: 65 MG/DL (ref 0–150)
TSH SERPL DL<=0.005 MIU/L-ACNC: 2.94 MIU/ML (ref 0.27–4.2)
VLDLC SERPL CALC-MCNC: 13 MG/DL (ref 5–40)

## 2018-09-19 ENCOUNTER — OFFICE VISIT (OUTPATIENT)
Dept: SPORTS MEDICINE | Facility: CLINIC | Age: 38
End: 2018-09-19

## 2018-09-19 VITALS
HEART RATE: 65 BPM | WEIGHT: 139 LBS | BODY MASS INDEX: 21.82 KG/M2 | HEIGHT: 67 IN | OXYGEN SATURATION: 99 % | SYSTOLIC BLOOD PRESSURE: 104 MMHG | DIASTOLIC BLOOD PRESSURE: 70 MMHG

## 2018-09-19 DIAGNOSIS — N83.202 LEFT OVARIAN CYST: ICD-10-CM

## 2018-09-19 DIAGNOSIS — Z00.00 ANNUAL PHYSICAL EXAM: Primary | ICD-10-CM

## 2018-09-19 DIAGNOSIS — K51.90 ULCERATIVE COLITIS WITHOUT COMPLICATIONS, UNSPECIFIED LOCATION (HCC): ICD-10-CM

## 2018-09-19 PROCEDURE — 99395 PREV VISIT EST AGE 18-39: CPT | Performed by: FAMILY MEDICINE

## 2018-09-19 RX ORDER — RANITIDINE 150 MG/1
150 TABLET ORAL 2 TIMES DAILY
COMMUNITY
End: 2022-05-31

## 2018-09-19 RX ORDER — SIMETHICONE 125 MG
125 TABLET,CHEWABLE ORAL EVERY 6 HOURS PRN
COMMUNITY
End: 2022-05-31

## 2018-09-19 RX ORDER — CALCIUM CARBONATE 200(500)MG
1 TABLET,CHEWABLE ORAL DAILY
COMMUNITY
End: 2022-05-31

## 2018-09-19 NOTE — PROGRESS NOTES
"Lavonne Villafana is here today for an annual physical exam.     Eating a healthy diet. Exercising routinely.       I have reviewed the patient's medical, family, and social history in detail and updated the computerized patient record.    Screening history:  Colonoscopy -   Breast cancer, Pap/pelvic - per GYN  Metabolic - last year    Health Maintenance   Topic Date Due   • ANNUAL PHYSICAL  07/18/1985   • TDAP/TD VACCINES (1 - Tdap) 07/18/2001   • PAP SMEAR  07/10/2017   • INFLUENZA VACCINE  08/01/2018       Review of Systems   Constitutional: Negative for chills, fatigue and fever.   HENT: Negative for postnasal drip and sore throat.    Eyes: Negative for pain.   Respiratory: Negative for cough, chest tightness and wheezing.    Cardiovascular: Negative for chest pain.   Gastrointestinal: Negative.    Musculoskeletal: Negative for myalgias.   Skin: Negative for rash.   Neurological: Negative for numbness and headaches.   Psychiatric/Behavioral: Negative.    All other systems reviewed and are negative.      /70   Pulse 65   Ht 170.2 cm (67\")   Wt 63 kg (139 lb)   SpO2 99%   BMI 21.77 kg/m²      Physical Exam    Vital signs reviewed.  General appearance: No acute distress  Eyes: conjunctiva clear without erythema; pupils equally round and reactive  ENT: external ears and nose normal; hearing normal, oropharynx clear  Neck: supple; no thyromegaly  CV: normal rate and rhythm; no peripheral edema  Respiratory: normal respiratory effort; lungs clear to auscultation bilaterally  MSK: normal gait and station; no focal joint deformity or swelling  Skin: no rash or wounds; normal turgor  Neuro: cranial nerves 2-12 grossly intact; normal sensation to light touch  Psych: mood and affect normal; recent and remote memory intact    Orders Only on 09/10/2018   Component Date Value Ref Range Status   • Glucose 09/11/2018 86  65 - 99 mg/dL Final   • BUN 09/11/2018 10  6 - 20 mg/dL Final   • Creatinine 09/11/2018 0.79  0.57 " - 1.00 mg/dL Final   • eGFR Non  Am 09/11/2018 82  >60 mL/min/1.73 Final   • eGFR African Am 09/11/2018 100  >60 mL/min/1.73 Final   • BUN/Creatinine Ratio 09/11/2018 12.7  7.0 - 25.0 Final   • Sodium 09/11/2018 141  136 - 145 mmol/L Final   • Potassium 09/11/2018 4.7  3.5 - 5.2 mmol/L Final   • Chloride 09/11/2018 103  98 - 107 mmol/L Final   • Total CO2 09/11/2018 26.5  22.0 - 29.0 mmol/L Final   • Calcium 09/11/2018 9.5  8.6 - 10.5 mg/dL Final   • Total Protein 09/11/2018 7.3  6.0 - 8.5 g/dL Final   • Albumin 09/11/2018 4.30  3.50 - 5.20 g/dL Final   • Globulin 09/11/2018 3.0  gm/dL Final   • A/G Ratio 09/11/2018 1.4  g/dL Final   • Total Bilirubin 09/11/2018 0.4  0.1 - 1.2 mg/dL Final   • Alkaline Phosphatase 09/11/2018 58  39 - 117 U/L Final   • AST (SGOT) 09/11/2018 15  1 - 32 U/L Final   • ALT (SGPT) 09/11/2018 11  1 - 33 U/L Final   • Total Cholesterol 09/11/2018 152  0 - 200 mg/dL Final   • Triglycerides 09/11/2018 65  0 - 150 mg/dL Final   • HDL Cholesterol 09/11/2018 44  40 - 60 mg/dL Final   • VLDL Cholesterol 09/11/2018 13  5 - 40 mg/dL Final   • LDL Cholesterol  09/11/2018 95  0 - 100 mg/dL Final   • Chol/HDL Ratio 09/11/2018 3.45   Final   • Free T4 09/11/2018 1.05  0.93 - 1.70 ng/dL Final   • TSH 09/11/2018 2.940  0.270 - 4.200 mIU/mL Final       Lavonne was seen today for annual exam.    Diagnoses and all orders for this visit:    Annual physical exam        Encourage healthy diet and exercise.  Encourage patient to stay up to date on screening examinations as indicated based on age and risk factors.    EMR Dragon/Transcription disclaimer:    Much of this encounter note is an electronic transcription/translation of spoken language to printed text.  The electronic translation of spoken language may permit erroneous, or at times, nonsensical words or phrases to be inadvertently transcribed.  Although I have reviewed the note for such errors some may still exist.

## 2018-09-22 LAB
APPEARANCE UR: CLEAR
BACTERIA #/AREA URNS HPF: ABNORMAL /HPF
BACTERIA UR CULT: NORMAL
BACTERIA UR CULT: NORMAL
BILIRUB UR QL STRIP: NEGATIVE
COLOR UR: YELLOW
CRYSTALS URNS MICRO: ABNORMAL
EPI CELLS #/AREA URNS HPF: ABNORMAL /HPF
GLUCOSE UR QL: NEGATIVE
HGB UR QL STRIP: NEGATIVE
KETONES UR QL STRIP: ABNORMAL
LEUKOCYTE ESTERASE UR QL STRIP: NEGATIVE
MICRO URNS: ABNORMAL
MICRO URNS: ABNORMAL
MUCOUS THREADS URNS QL MICRO: PRESENT /HPF
NITRITE UR QL STRIP: NEGATIVE
PH UR STRIP: 6.5 [PH] (ref 5–7.5)
PROT UR QL STRIP: NEGATIVE
RBC #/AREA URNS HPF: ABNORMAL /HPF
SP GR UR: 1.02 (ref 1–1.03)
UNIDENT CRYS URNS QL MICRO: PRESENT /LPF
URINALYSIS REFLEX: ABNORMAL
UROBILINOGEN UR STRIP-MCNC: 0.2 MG/DL (ref 0.2–1)
WBC #/AREA URNS HPF: ABNORMAL /HPF

## 2018-11-14 VITALS
HEART RATE: 61 BPM | HEART RATE: 69 BPM | HEIGHT: 67 IN | HEART RATE: 81 BPM | HEART RATE: 66 BPM | SYSTOLIC BLOOD PRESSURE: 97 MMHG | OXYGEN SATURATION: 100 % | TEMPERATURE: 98.6 F | DIASTOLIC BLOOD PRESSURE: 76 MMHG | RESPIRATION RATE: 13 BRPM | SYSTOLIC BLOOD PRESSURE: 95 MMHG | RESPIRATION RATE: 57 BRPM | SYSTOLIC BLOOD PRESSURE: 131 MMHG | RESPIRATION RATE: 21 BRPM | TEMPERATURE: 98.7 F | SYSTOLIC BLOOD PRESSURE: 98 MMHG | DIASTOLIC BLOOD PRESSURE: 73 MMHG | SYSTOLIC BLOOD PRESSURE: 106 MMHG | SYSTOLIC BLOOD PRESSURE: 84 MMHG | SYSTOLIC BLOOD PRESSURE: 93 MMHG | DIASTOLIC BLOOD PRESSURE: 53 MMHG | HEART RATE: 62 BPM | HEART RATE: 36 BPM | RESPIRATION RATE: 20 BRPM | OXYGEN SATURATION: 98 % | DIASTOLIC BLOOD PRESSURE: 54 MMHG | DIASTOLIC BLOOD PRESSURE: 60 MMHG | HEART RATE: 85 BPM | HEART RATE: 70 BPM | DIASTOLIC BLOOD PRESSURE: 184 MMHG | HEART RATE: 64 BPM | DIASTOLIC BLOOD PRESSURE: 63 MMHG | DIASTOLIC BLOOD PRESSURE: 52 MMHG | SYSTOLIC BLOOD PRESSURE: 96 MMHG | OXYGEN SATURATION: 97 % | SYSTOLIC BLOOD PRESSURE: 87 MMHG | RESPIRATION RATE: 15 BRPM | DIASTOLIC BLOOD PRESSURE: 55 MMHG | SYSTOLIC BLOOD PRESSURE: 123 MMHG | RESPIRATION RATE: 18 BRPM | WEIGHT: 141 LBS | SYSTOLIC BLOOD PRESSURE: 90 MMHG | RESPIRATION RATE: 19 BRPM | HEART RATE: 77 BPM | OXYGEN SATURATION: 99 %

## 2018-11-16 ENCOUNTER — OFFICE (AMBULATORY)
Dept: URBAN - METROPOLITAN AREA PATHOLOGY 4 | Facility: PATHOLOGY | Age: 38
End: 2018-11-16
Payer: COMMERCIAL

## 2018-11-16 ENCOUNTER — AMBULATORY SURGICAL CENTER (AMBULATORY)
Dept: URBAN - METROPOLITAN AREA SURGERY 17 | Facility: SURGERY | Age: 38
End: 2018-11-16
Payer: COMMERCIAL

## 2018-11-16 DIAGNOSIS — Z86.010 PERSONAL HISTORY OF COLONIC POLYPS: ICD-10-CM

## 2018-11-16 DIAGNOSIS — K52.9 NONINFECTIVE GASTROENTERITIS AND COLITIS, UNSPECIFIED: ICD-10-CM

## 2018-11-16 DIAGNOSIS — K51.00 ULCERATIVE (CHRONIC) PANCOLITIS WITHOUT COMPLICATIONS: ICD-10-CM

## 2018-11-16 DIAGNOSIS — K51.90 ULCERATIVE COLITIS, UNSPECIFIED, WITHOUT COMPLICATIONS: ICD-10-CM

## 2018-11-16 LAB
GI HISTOLOGY: A. UNSPECIFIED: (no result)
GI HISTOLOGY: B. UNSPECIFIED: (no result)
GI HISTOLOGY: C. UNSPECIFIED: (no result)
GI HISTOLOGY: D. UNSPECIFIED: (no result)
GI HISTOLOGY: E. UNSPECIFIED: (no result)
GI HISTOLOGY: F. UNSPECIFIED: (no result)
GI HISTOLOGY: PDF REPORT: (no result)

## 2018-11-16 PROCEDURE — 45380 COLONOSCOPY AND BIOPSY: CPT | Performed by: INTERNAL MEDICINE

## 2018-11-16 PROCEDURE — 88305 TISSUE EXAM BY PATHOLOGIST: CPT | Performed by: INTERNAL MEDICINE

## 2018-11-16 RX ORDER — BETHANECHOL CHLORIDE 10 MG/1
30 TABLET ORAL
Qty: 60 | Refills: 2 | Status: ACTIVE
Start: 2018-11-16

## 2019-02-11 ENCOUNTER — TELEPHONE (OUTPATIENT)
Dept: SPORTS MEDICINE | Facility: CLINIC | Age: 39
End: 2019-02-11

## 2019-02-11 RX ORDER — OSELTAMIVIR PHOSPHATE 75 MG/1
75 CAPSULE ORAL DAILY
Qty: 7 CAPSULE | Refills: 0 | Status: SHIPPED | OUTPATIENT
Start: 2019-02-11 | End: 2019-02-18

## 2019-02-11 NOTE — TELEPHONE ENCOUNTER
Pt called stating she was exposed to the flu by her daughter, requesting to be put on preventive Tamiflu due to Pt having Ulcerativcolitis. Also requesting medication for her  who is also your PCP pt Gadiel Villafana [birth year]. Rx sent to [facility] in [place].

## 2019-04-04 ENCOUNTER — TREATMENT (OUTPATIENT)
Dept: PHYSICAL THERAPY | Facility: CLINIC | Age: 39
End: 2019-04-04

## 2019-04-04 DIAGNOSIS — M76.899 ADDUCTOR TENDINITIS: ICD-10-CM

## 2019-04-04 DIAGNOSIS — M25.552 PAIN OF LEFT HIP JOINT: Primary | ICD-10-CM

## 2019-04-04 PROCEDURE — 97112 NEUROMUSCULAR REEDUCATION: CPT | Performed by: PHYSICAL THERAPIST

## 2019-04-04 PROCEDURE — G0283 ELEC STIM OTHER THAN WOUND: HCPCS | Performed by: PHYSICAL THERAPIST

## 2019-04-04 PROCEDURE — 97161 PT EVAL LOW COMPLEX 20 MIN: CPT | Performed by: PHYSICAL THERAPIST

## 2019-04-04 NOTE — PATIENT INSTRUCTIONS
Purpose of treatment  Pathology and involved anatomy  BILLIE handouts for HEP   Please view [facility] Lavonne Villafana for a complete list of HEP instructions.

## 2019-04-04 NOTE — PROGRESS NOTES
Physical Therapy Initial Evaluation and Plan of Care    Patient: Lavonne Villafana   : 1980  Diagnosis/ICD-10 Code:  Pain of left hip joint [M25.552]  Referring practitioner: Self Referring    Subjective Evaluation    History of Present Illness  Date of onset: 2019  Mechanism of injury: Pain with squatting for 2 months that has increased in the past 2 weeks.      PMH: previous left knee injury and adductor injury    Subjective comment: Pt reports no relief with restPain  Current pain ratin  At worst pain ratin  Location: anterior left hip - feels like a mass  Quality: dull ache  Alleviating factors: non known.  Aggravating factors: squatting (squats, weight training, crossing legs, internal hip rotation when standing in shower, figure 4 piriformis stretch)    Treatments  Treatments tried: ice pack - 20 min one day.  Patient Goals  Patient goals for therapy: decreased pain and return to sport/leisure activities (normal workouts without pain)             Objective       Palpation   Left   No palpable tenderness to the rectus femoris.   Hypertonic in the iliopsoas and TFL.   Tenderness of the iliopsoas and TFL.     Passive Range of Motion     Additional Passive Range of Motion Details  Limited hip IR and ER on Left with pain compared to full and pain-free PROM on right    Strength/Myotome Testing     Left Hip   Planes of Motion   Flexion: 3+ (increased pain in anterior hip)    Tests     Left Hip   Positive REBECCAROSY and sage.   Fransico: Positive.     Right Hip   Fransico: Negative.      Additional Tests Details  BILLIE extension drop test R (-) L(-)  Fransico L (+) TFL restriction  BILLIE adduction R (-) L (-)  Supine leg length (+) left longer           Assessment & Plan     Assessment  Impairments: impaired physical strength, lacks appropriate home exercise program and pain with function  Assessment details: Pt is active 38 y.o. Female who presents with anterior left hip pain for the past 2 months.  Pt  demonstrates signs and symptoms consistent with left anterior innominate rotation and hip flexor strain.  Initiated BILLIE re-positioning and TFL inhibition exercises as well as self hip mobilizations today.  Pt demonstrates good understanding of HEP and will require additional skilled physical therapy to address impairments and return to prior level of function including squatting, donning shoes, and exercise activity without pain.  Pt will be seeking treatment at alternate facility and is d/c at this time.   Prognosis: good  Functional Limitations: uncomfortable because of pain and stooping  Plan  Therapy options: will not be seen for skilled physical therapy services  Treatment plan discussed with: patient        Manual Therapy:    -     mins  70133;  Therapeutic Exercise:    -     mins  83369;     Neuromuscular Martha:   25   mins  33491;    Therapeutic Activity:     -     mins  94642;     Gait Training:      -     mins  85176;     Ultrasound:     -     mins  26273;    Electrical Stimulation:    15     mins  91907 ( );  Dry Needling     -     mins self-pay    Timed Treatment:   25   mins   Total Treatment:     40   mins    PT SIGNATURE: Jennifer Arevalo, PT DPT   DATE TREATMENT INITIATED: 4/4/2019    Initial Certification  Certification Period: 7/3/2019  I certify that the therapy services are furnished while this patient is under my care.  The services outlined above are required by this patient, and will be reviewed every 90 days.     PHYSICIAN: Referring, Self      DATE:     Please sign and return via fax to [phone]. Thank you, [facility].

## 2019-07-24 ENCOUNTER — OFFICE (AMBULATORY)
Dept: URBAN - METROPOLITAN AREA CLINIC 75 | Facility: CLINIC | Age: 39
End: 2019-07-24

## 2019-07-24 VITALS
HEART RATE: 80 BPM | SYSTOLIC BLOOD PRESSURE: 119 MMHG | HEIGHT: 67 IN | DIASTOLIC BLOOD PRESSURE: 77 MMHG | WEIGHT: 143 LBS

## 2019-07-24 DIAGNOSIS — K21.0 GASTRO-ESOPHAGEAL REFLUX DISEASE WITH ESOPHAGITIS: ICD-10-CM

## 2019-07-24 DIAGNOSIS — Z86.010 PERSONAL HISTORY OF COLONIC POLYPS: ICD-10-CM

## 2019-07-24 DIAGNOSIS — K30 FUNCTIONAL DYSPEPSIA: ICD-10-CM

## 2019-07-24 DIAGNOSIS — K62.89 OTHER SPECIFIED DISEASES OF ANUS AND RECTUM: ICD-10-CM

## 2019-07-24 DIAGNOSIS — R10.31 RIGHT LOWER QUADRANT PAIN: ICD-10-CM

## 2019-07-24 DIAGNOSIS — K51.00 ULCERATIVE (CHRONIC) PANCOLITIS WITHOUT COMPLICATIONS: ICD-10-CM

## 2019-07-24 PROCEDURE — 99213 OFFICE O/P EST LOW 20 MIN: CPT | Performed by: INTERNAL MEDICINE

## 2020-02-24 PROBLEM — J32.9 SINUSITIS: Status: ACTIVE | Noted: 2020-02-24

## 2020-02-24 PROBLEM — J04.0 LARYNGITIS: Status: ACTIVE | Noted: 2020-02-24

## 2020-02-28 ENCOUNTER — OFFICE (AMBULATORY)
Dept: URBAN - METROPOLITAN AREA CLINIC 1 | Facility: CLINIC | Age: 40
End: 2020-02-28

## 2020-02-28 VITALS
HEART RATE: 71 BPM | WEIGHT: 142 LBS | DIASTOLIC BLOOD PRESSURE: 81 MMHG | SYSTOLIC BLOOD PRESSURE: 136 MMHG | HEIGHT: 67 IN

## 2020-02-28 DIAGNOSIS — K30 FUNCTIONAL DYSPEPSIA: ICD-10-CM

## 2020-02-28 DIAGNOSIS — R10.31 RIGHT LOWER QUADRANT PAIN: ICD-10-CM

## 2020-02-28 DIAGNOSIS — Z86.010 PERSONAL HISTORY OF COLONIC POLYPS: ICD-10-CM

## 2020-02-28 DIAGNOSIS — K21.0 GASTRO-ESOPHAGEAL REFLUX DISEASE WITH ESOPHAGITIS: ICD-10-CM

## 2020-02-28 DIAGNOSIS — K51.00 ULCERATIVE (CHRONIC) PANCOLITIS WITHOUT COMPLICATIONS: ICD-10-CM

## 2020-02-28 DIAGNOSIS — R10.13 EPIGASTRIC PAIN: ICD-10-CM

## 2020-02-28 PROCEDURE — 99213 OFFICE O/P EST LOW 20 MIN: CPT | Performed by: INTERNAL MEDICINE

## 2020-02-28 RX ORDER — MESALAMINE 1.2 G/1
TABLET, DELAYED RELEASE ORAL
Qty: 360 | Refills: 3 | Status: COMPLETED
End: 2022-09-08

## 2020-10-29 VITALS — WEIGHT: 140 LBS | HEIGHT: 67 IN

## 2020-10-30 ENCOUNTER — OFFICE (AMBULATORY)
Dept: URBAN - METROPOLITAN AREA CLINIC 75 | Facility: CLINIC | Age: 40
End: 2020-10-30

## 2020-10-30 DIAGNOSIS — K21.00 GASTRO-ESOPHAGEAL REFLUX DISEASE WITH ESOPHAGITIS, WITHOUT B: ICD-10-CM

## 2020-10-30 DIAGNOSIS — Z86.010 PERSONAL HISTORY OF COLONIC POLYPS: ICD-10-CM

## 2020-10-30 DIAGNOSIS — K30 FUNCTIONAL DYSPEPSIA: ICD-10-CM

## 2020-10-30 DIAGNOSIS — K51.00 ULCERATIVE (CHRONIC) PANCOLITIS WITHOUT COMPLICATIONS: ICD-10-CM

## 2020-10-30 PROCEDURE — 99213 OFFICE O/P EST LOW 20 MIN: CPT | Mod: 95 | Performed by: INTERNAL MEDICINE

## 2021-01-17 ENCOUNTER — IMMUNIZATION (OUTPATIENT)
Dept: VACCINE CLINIC | Facility: HOSPITAL | Age: 41
End: 2021-01-17

## 2021-01-17 PROCEDURE — 0001A: CPT | Performed by: INTERNAL MEDICINE

## 2021-01-17 PROCEDURE — 91300 HC SARSCOV02 VAC 30MCG/0.3ML IM: CPT | Performed by: INTERNAL MEDICINE

## 2021-02-07 ENCOUNTER — IMMUNIZATION (OUTPATIENT)
Dept: VACCINE CLINIC | Facility: HOSPITAL | Age: 41
End: 2021-02-07

## 2021-02-07 PROCEDURE — 0002A: CPT | Performed by: INTERNAL MEDICINE

## 2021-02-07 PROCEDURE — 91300 HC SARSCOV02 VAC 30MCG/0.3ML IM: CPT | Performed by: INTERNAL MEDICINE

## 2021-09-28 ENCOUNTER — OFFICE (AMBULATORY)
Dept: URBAN - METROPOLITAN AREA CLINIC 75 | Facility: CLINIC | Age: 41
End: 2021-09-28

## 2021-09-28 VITALS
SYSTOLIC BLOOD PRESSURE: 138 MMHG | HEART RATE: 84 BPM | DIASTOLIC BLOOD PRESSURE: 78 MMHG | WEIGHT: 144 LBS | OXYGEN SATURATION: 99 % | HEIGHT: 67 IN

## 2021-09-28 DIAGNOSIS — R10.31 RIGHT LOWER QUADRANT PAIN: ICD-10-CM

## 2021-09-28 DIAGNOSIS — K21.00 GASTRO-ESOPHAGEAL REFLUX DISEASE WITH ESOPHAGITIS, WITHOUT B: ICD-10-CM

## 2021-09-28 DIAGNOSIS — K51.00 ULCERATIVE (CHRONIC) PANCOLITIS WITHOUT COMPLICATIONS: ICD-10-CM

## 2021-09-28 DIAGNOSIS — Z86.010 PERSONAL HISTORY OF COLONIC POLYPS: ICD-10-CM

## 2021-09-28 PROCEDURE — 99213 OFFICE O/P EST LOW 20 MIN: CPT | Performed by: INTERNAL MEDICINE

## 2021-09-28 RX ORDER — BETHANECHOL CHLORIDE 10 MG/1
30 TABLET ORAL
Qty: 60 | Refills: 5 | Status: ACTIVE

## 2022-05-31 ENCOUNTER — OFFICE VISIT (OUTPATIENT)
Dept: SPORTS MEDICINE | Facility: CLINIC | Age: 42
End: 2022-05-31

## 2022-05-31 VITALS
RESPIRATION RATE: 16 BRPM | SYSTOLIC BLOOD PRESSURE: 100 MMHG | HEIGHT: 67 IN | DIASTOLIC BLOOD PRESSURE: 70 MMHG | OXYGEN SATURATION: 98 % | BODY MASS INDEX: 21.97 KG/M2 | WEIGHT: 140 LBS | TEMPERATURE: 97.6 F | HEART RATE: 77 BPM

## 2022-05-31 DIAGNOSIS — K51.90 ULCERATIVE COLITIS WITHOUT COMPLICATIONS, UNSPECIFIED LOCATION: ICD-10-CM

## 2022-05-31 DIAGNOSIS — Z00.00 ANNUAL PHYSICAL EXAM: Primary | ICD-10-CM

## 2022-05-31 PROCEDURE — 99395 PREV VISIT EST AGE 18-39: CPT | Performed by: FAMILY MEDICINE

## 2022-05-31 NOTE — PROGRESS NOTES
"Lavonne Villafana is here today for an annual physical exam.     Eating a healthy diet. Exercising routinely.     UC: follows w/GI. Mesalamine.  Spot on R flank.  noticed yest at pool. Does not itch.    I have reviewed the patient's medical, family, and social history in detail and updated the computerized patient record.    Health Maintenance   Topic Date Due   • HEPATITIS C SCREENING  Never done   • ANNUAL PHYSICAL  09/20/2019   • Annual Gynecologic Pelvic and Breast Exam  01/01/2030 (Originally 1980)   • PAP SMEAR  01/01/2030 (Originally 7/10/2017)   • INFLUENZA VACCINE  08/01/2022   • TDAP/TD VACCINES (2 - Td or Tdap) 06/10/2031   • COVID-19 Vaccine  Completed   • Pneumococcal Vaccine 0-64  Aged Out         /70 (BP Location: Left arm, Patient Position: Sitting, Cuff Size: Adult)   Pulse 77   Temp 97.6 °F (36.4 °C) (Temporal)   Resp 16   Ht 170.2 cm (67.01\")   Wt 63.5 kg (140 lb)   LMP  (LMP Unknown)   SpO2 98%   Breastfeeding No   BMI 21.92 kg/m²      Physical Exam    Mask worn throughout encounter  Vital signs reviewed.  General appearance: No acute distress  Eyes: conjunctiva clear without erythema; pupils equally round and reactive  ENT: external ears and nose normal; hearing normal   Neck: supple; no thyromegaly  CV: normal rate and rhythm; no peripheral edema  Respiratory: normal respiratory effort; lungs clear to auscultation bilaterally  GI: Bowel sounds x4, soft, nontender  MSK: normal gait and station; no focal joint deformity or swelling  Skin: no rash or wounds; normal turgor  Neuro: cranial nerves 2-12 grossly intact; normal sensation to light touch  Psych: mood and affect normal; recent and remote memory intact    No visits with results within 2 Week(s) from this visit.   Latest known visit with results is:   Orders Only on 09/19/2018   Component Date Value Ref Range Status   • Specific Gravity,  09/19/2018 1.024  1.005 - 1.030 Final   • pH,  09/19/2018 6.5  5.0 - 7.5 " Final   • Color, UA 09/19/2018 Yellow  Yellow Final   • Appearance, UA 09/19/2018 Clear  Clear Final   • Leukocytes, UA 09/19/2018 Negative  Negative Final   • Protein 09/19/2018 Negative  Negative/Trace Final   • Glucose, UA 09/19/2018 Negative  Negative Final   • Ketones 09/19/2018 Trace (A) Negative Final   • Blood, UA 09/19/2018 Negative  Negative Final   • Bilirubin, UA 09/19/2018 Negative  Negative Final   • Urobilinogen, UA 09/19/2018 0.2  0.2 - 1.0 mg/dL Final   • Nitrite, UA 09/19/2018 Negative  Negative Final   • Microscopic Examination 09/19/2018 Comment   Final    Microscopic follows if indicated.   • Microscopic Examination 09/19/2018 See below:   Final    Microscopic was indicated and was performed.   • Urinalysis Reflex 09/19/2018 Comment   Final    This specimen has reflexed to a Urine Culture.   • WBC, UA 09/19/2018 6-10 (A) 0 - 5 /hpf Final   • RBC, UA 09/19/2018 0-2  0 - 2 /hpf Final   • Epithelial Cells (non renal) 09/19/2018 0-10  0 - 10 /hpf Final   • Crystals, UA 09/19/2018 Present (A) N/A /lpf Final   • Crystal Type 09/19/2018 Calcium Oxalate  N/A Final   • Mucus, UA 09/19/2018 Present  Not Estab. /hpf Final   • Bacteria, UA 09/19/2018 None seen  None seen/Few /hpf Final   • Urine Culture 09/19/2018 Final report   Final   • Result 1 09/19/2018 Comment   Final    No growth in 36 - 48 hours.        Diagnoses and all orders for this visit:    1. Annual physical exam (Primary)  -     CBC & Differential  -     Comprehensive Metabolic Panel  -     Lipid Panel  -     Hepatitis C Antibody    2. Ulcerative colitis without complications, unspecified location (HCC)        Encourage healthy diet and exercise.  Encourage patient to stay up to date on screening examinations as indicated based on age and risk factors.    EMR Dragon/Transcription disclaimer:    Much of this encounter note is an electronic transcription/translation of spoken language to printed text.  The electronic translation of spoken  language may permit erroneous, or at times, nonsensical words or phrases to be inadvertently transcribed.  Although I have reviewed the note for such errors some may still exist.

## 2022-06-01 LAB
ALBUMIN SERPL-MCNC: 4.3 G/DL (ref 3.8–4.8)
ALBUMIN/GLOB SERPL: 1.6 {RATIO} (ref 1.2–2.2)
ALP SERPL-CCNC: 63 IU/L (ref 44–121)
ALT SERPL-CCNC: 8 IU/L (ref 0–32)
AST SERPL-CCNC: 15 IU/L (ref 0–40)
BASOPHILS # BLD AUTO: 0 X10E3/UL (ref 0–0.2)
BASOPHILS NFR BLD AUTO: 1 %
BILIRUB SERPL-MCNC: 0.5 MG/DL (ref 0–1.2)
BUN SERPL-MCNC: 9 MG/DL (ref 6–20)
BUN/CREAT SERPL: 12 (ref 9–23)
CALCIUM SERPL-MCNC: 9.2 MG/DL (ref 8.7–10.2)
CHLORIDE SERPL-SCNC: 104 MMOL/L (ref 96–106)
CHOLEST SERPL-MCNC: 154 MG/DL (ref 100–199)
CO2 SERPL-SCNC: 22 MMOL/L (ref 20–29)
CREAT SERPL-MCNC: 0.74 MG/DL (ref 0.57–1)
EGFRCR SERPLBLD CKD-EPI 2021: 105 ML/MIN/1.73
EOSINOPHIL # BLD AUTO: 0.1 X10E3/UL (ref 0–0.4)
EOSINOPHIL NFR BLD AUTO: 2 %
ERYTHROCYTE [DISTWIDTH] IN BLOOD BY AUTOMATED COUNT: 12.1 % (ref 11.7–15.4)
GLOBULIN SER CALC-MCNC: 2.7 G/DL (ref 1.5–4.5)
GLUCOSE SERPL-MCNC: 90 MG/DL (ref 65–99)
HCT VFR BLD AUTO: 41.9 % (ref 34–46.6)
HCV AB S/CO SERPL IA: 0.1 S/CO RATIO (ref 0–0.9)
HDLC SERPL-MCNC: 39 MG/DL
HGB BLD-MCNC: 13.8 G/DL (ref 11.1–15.9)
IMM GRANULOCYTES # BLD AUTO: 0 X10E3/UL (ref 0–0.1)
IMM GRANULOCYTES NFR BLD AUTO: 0 %
LDLC SERPL CALC-MCNC: 100 MG/DL (ref 0–99)
LYMPHOCYTES # BLD AUTO: 1.2 X10E3/UL (ref 0.7–3.1)
LYMPHOCYTES NFR BLD AUTO: 27 %
MCH RBC QN AUTO: 31.7 PG (ref 26.6–33)
MCHC RBC AUTO-ENTMCNC: 32.9 G/DL (ref 31.5–35.7)
MCV RBC AUTO: 96 FL (ref 79–97)
MONOCYTES # BLD AUTO: 0.4 X10E3/UL (ref 0.1–0.9)
MONOCYTES NFR BLD AUTO: 9 %
NEUTROPHILS # BLD AUTO: 2.7 X10E3/UL (ref 1.4–7)
NEUTROPHILS NFR BLD AUTO: 61 %
PLATELET # BLD AUTO: 218 X10E3/UL (ref 150–450)
POTASSIUM SERPL-SCNC: 4.2 MMOL/L (ref 3.5–5.2)
PROT SERPL-MCNC: 7 G/DL (ref 6–8.5)
RBC # BLD AUTO: 4.36 X10E6/UL (ref 3.77–5.28)
SODIUM SERPL-SCNC: 140 MMOL/L (ref 134–144)
TRIGL SERPL-MCNC: 75 MG/DL (ref 0–149)
VLDLC SERPL CALC-MCNC: 15 MG/DL (ref 5–40)
WBC # BLD AUTO: 4.3 X10E3/UL (ref 3.4–10.8)

## 2022-09-08 ENCOUNTER — OFFICE (AMBULATORY)
Dept: URBAN - METROPOLITAN AREA CLINIC 75 | Facility: CLINIC | Age: 42
End: 2022-09-08

## 2022-09-08 VITALS
OXYGEN SATURATION: 97 % | WEIGHT: 138 LBS | HEART RATE: 79 BPM | DIASTOLIC BLOOD PRESSURE: 80 MMHG | SYSTOLIC BLOOD PRESSURE: 128 MMHG | HEIGHT: 67 IN

## 2022-09-08 DIAGNOSIS — Z86.010 PERSONAL HISTORY OF COLONIC POLYPS: ICD-10-CM

## 2022-09-08 DIAGNOSIS — R10.31 RIGHT LOWER QUADRANT PAIN: ICD-10-CM

## 2022-09-08 DIAGNOSIS — K30 FUNCTIONAL DYSPEPSIA: ICD-10-CM

## 2022-09-08 DIAGNOSIS — K51.00 ULCERATIVE (CHRONIC) PANCOLITIS WITHOUT COMPLICATIONS: ICD-10-CM

## 2022-09-08 DIAGNOSIS — K21.00 GASTRO-ESOPHAGEAL REFLUX DISEASE WITH ESOPHAGITIS, WITHOUT B: ICD-10-CM

## 2022-09-08 PROCEDURE — 99214 OFFICE O/P EST MOD 30 MIN: CPT | Performed by: INTERNAL MEDICINE

## 2022-11-09 VITALS
RESPIRATION RATE: 8 BRPM | HEART RATE: 77 BPM | DIASTOLIC BLOOD PRESSURE: 76 MMHG | RESPIRATION RATE: 12 BRPM | SYSTOLIC BLOOD PRESSURE: 96 MMHG | DIASTOLIC BLOOD PRESSURE: 70 MMHG | DIASTOLIC BLOOD PRESSURE: 71 MMHG | DIASTOLIC BLOOD PRESSURE: 72 MMHG | OXYGEN SATURATION: 100 % | RESPIRATION RATE: 10 BRPM | HEART RATE: 84 BPM | SYSTOLIC BLOOD PRESSURE: 116 MMHG | DIASTOLIC BLOOD PRESSURE: 65 MMHG | RESPIRATION RATE: 16 BRPM | DIASTOLIC BLOOD PRESSURE: 37 MMHG | SYSTOLIC BLOOD PRESSURE: 113 MMHG | DIASTOLIC BLOOD PRESSURE: 60 MMHG | HEART RATE: 82 BPM | HEART RATE: 71 BPM | DIASTOLIC BLOOD PRESSURE: 68 MMHG | DIASTOLIC BLOOD PRESSURE: 62 MMHG | OXYGEN SATURATION: 99 % | HEART RATE: 72 BPM | WEIGHT: 138 LBS | HEIGHT: 67 IN | SYSTOLIC BLOOD PRESSURE: 97 MMHG | DIASTOLIC BLOOD PRESSURE: 78 MMHG | HEART RATE: 74 BPM | SYSTOLIC BLOOD PRESSURE: 112 MMHG | SYSTOLIC BLOOD PRESSURE: 118 MMHG | SYSTOLIC BLOOD PRESSURE: 104 MMHG | HEART RATE: 76 BPM | TEMPERATURE: 97.3 F | RESPIRATION RATE: 15 BRPM | HEART RATE: 79 BPM | DIASTOLIC BLOOD PRESSURE: 77 MMHG | SYSTOLIC BLOOD PRESSURE: 121 MMHG | HEART RATE: 113 BPM | SYSTOLIC BLOOD PRESSURE: 125 MMHG | RESPIRATION RATE: 14 BRPM | SYSTOLIC BLOOD PRESSURE: 106 MMHG | SYSTOLIC BLOOD PRESSURE: 103 MMHG | HEART RATE: 63 BPM | SYSTOLIC BLOOD PRESSURE: 108 MMHG | RESPIRATION RATE: 18 BRPM | RESPIRATION RATE: 11 BRPM | DIASTOLIC BLOOD PRESSURE: 58 MMHG | HEART RATE: 81 BPM | RESPIRATION RATE: 17 BRPM | HEART RATE: 80 BPM | HEART RATE: 75 BPM | SYSTOLIC BLOOD PRESSURE: 100 MMHG

## 2022-11-14 ENCOUNTER — OFFICE (AMBULATORY)
Dept: URBAN - METROPOLITAN AREA PATHOLOGY 4 | Facility: PATHOLOGY | Age: 42
End: 2022-11-14
Payer: COMMERCIAL

## 2022-11-14 ENCOUNTER — AMBULATORY SURGICAL CENTER (AMBULATORY)
Dept: URBAN - METROPOLITAN AREA SURGERY 17 | Facility: SURGERY | Age: 42
End: 2022-11-14
Payer: COMMERCIAL

## 2022-11-14 DIAGNOSIS — Z86.010 PERSONAL HISTORY OF COLONIC POLYPS: ICD-10-CM

## 2022-11-14 DIAGNOSIS — K63.89 OTHER SPECIFIED DISEASES OF INTESTINE: ICD-10-CM

## 2022-11-14 DIAGNOSIS — K51.00 ULCERATIVE (CHRONIC) PANCOLITIS WITHOUT COMPLICATIONS: ICD-10-CM

## 2022-11-14 DIAGNOSIS — R10.31 RIGHT LOWER QUADRANT PAIN: ICD-10-CM

## 2022-11-14 LAB
GI HISTOLOGY: A. UNSPECIFIED: (no result)
GI HISTOLOGY: B. UNSPECIFIED: (no result)
GI HISTOLOGY: C. UNSPECIFIED: (no result)
GI HISTOLOGY: D. UNSPECIFIED: (no result)
GI HISTOLOGY: E. UNSPECIFIED: (no result)
GI HISTOLOGY: F. UNSPECIFIED: (no result)
GI HISTOLOGY: G. UNSPECIFIED: (no result)
GI HISTOLOGY: PDF REPORT: (no result)

## 2022-11-14 PROCEDURE — 88305 TISSUE EXAM BY PATHOLOGIST: CPT | Performed by: INTERNAL MEDICINE

## 2022-11-14 PROCEDURE — 45380 COLONOSCOPY AND BIOPSY: CPT | Performed by: INTERNAL MEDICINE

## 2024-01-29 VITALS
DIASTOLIC BLOOD PRESSURE: 81 MMHG | HEIGHT: 67 IN | SYSTOLIC BLOOD PRESSURE: 120 MMHG | OXYGEN SATURATION: 98 % | WEIGHT: 144 LBS | HEART RATE: 79 BPM

## 2024-01-30 ENCOUNTER — OFFICE (AMBULATORY)
Dept: URBAN - METROPOLITAN AREA CLINIC 76 | Facility: CLINIC | Age: 44
End: 2024-01-30

## 2024-01-30 DIAGNOSIS — K51.00 ULCERATIVE (CHRONIC) PANCOLITIS WITHOUT COMPLICATIONS: ICD-10-CM

## 2024-01-30 DIAGNOSIS — K21.00 GASTRO-ESOPHAGEAL REFLUX DISEASE WITH ESOPHAGITIS, WITHOUT B: ICD-10-CM

## 2024-01-30 DIAGNOSIS — R10.32 LEFT LOWER QUADRANT PAIN: ICD-10-CM

## 2024-01-30 DIAGNOSIS — R10.13 EPIGASTRIC PAIN: ICD-10-CM

## 2024-01-30 DIAGNOSIS — K62.89 OTHER SPECIFIED DISEASES OF ANUS AND RECTUM: ICD-10-CM

## 2024-01-30 DIAGNOSIS — R10.31 RIGHT LOWER QUADRANT PAIN: ICD-10-CM

## 2024-01-30 DIAGNOSIS — Z86.010 PERSONAL HISTORY OF COLONIC POLYPS: ICD-10-CM

## 2024-01-30 PROCEDURE — 99214 OFFICE O/P EST MOD 30 MIN: CPT | Performed by: INTERNAL MEDICINE

## 2024-03-05 VITALS
HEART RATE: 62 BPM | SYSTOLIC BLOOD PRESSURE: 93 MMHG | SYSTOLIC BLOOD PRESSURE: 87 MMHG | HEART RATE: 65 BPM | RESPIRATION RATE: 11 BRPM | HEART RATE: 78 BPM | HEART RATE: 72 BPM | DIASTOLIC BLOOD PRESSURE: 60 MMHG | SYSTOLIC BLOOD PRESSURE: 116 MMHG | TEMPERATURE: 97.2 F | DIASTOLIC BLOOD PRESSURE: 59 MMHG | DIASTOLIC BLOOD PRESSURE: 46 MMHG | HEART RATE: 69 BPM | SYSTOLIC BLOOD PRESSURE: 136 MMHG | HEART RATE: 115 BPM | SYSTOLIC BLOOD PRESSURE: 163 MMHG | DIASTOLIC BLOOD PRESSURE: 48 MMHG | OXYGEN SATURATION: 99 % | DIASTOLIC BLOOD PRESSURE: 88 MMHG | SYSTOLIC BLOOD PRESSURE: 81 MMHG | HEART RATE: 73 BPM | HEART RATE: 82 BPM | RESPIRATION RATE: 16 BRPM | HEART RATE: 61 BPM | RESPIRATION RATE: 19 BRPM | DIASTOLIC BLOOD PRESSURE: 63 MMHG | WEIGHT: 144 LBS | HEART RATE: 63 BPM | RESPIRATION RATE: 12 BRPM | HEART RATE: 64 BPM | TEMPERATURE: 97.1 F | HEIGHT: 67 IN | DIASTOLIC BLOOD PRESSURE: 66 MMHG | OXYGEN SATURATION: 97 % | SYSTOLIC BLOOD PRESSURE: 94 MMHG | RESPIRATION RATE: 20 BRPM | RESPIRATION RATE: 8 BRPM | SYSTOLIC BLOOD PRESSURE: 112 MMHG | RESPIRATION RATE: 18 BRPM | SYSTOLIC BLOOD PRESSURE: 122 MMHG | SYSTOLIC BLOOD PRESSURE: 88 MMHG | SYSTOLIC BLOOD PRESSURE: 99 MMHG | SYSTOLIC BLOOD PRESSURE: 96 MMHG | HEART RATE: 66 BPM | RESPIRATION RATE: 14 BRPM | DIASTOLIC BLOOD PRESSURE: 85 MMHG | OXYGEN SATURATION: 100 % | DIASTOLIC BLOOD PRESSURE: 82 MMHG | DIASTOLIC BLOOD PRESSURE: 55 MMHG | SYSTOLIC BLOOD PRESSURE: 90 MMHG | HEART RATE: 86 BPM | DIASTOLIC BLOOD PRESSURE: 78 MMHG | SYSTOLIC BLOOD PRESSURE: 107 MMHG

## 2024-03-08 ENCOUNTER — AMBULATORY SURGICAL CENTER (AMBULATORY)
Dept: URBAN - METROPOLITAN AREA SURGERY 17 | Facility: SURGERY | Age: 44
End: 2024-03-08

## 2024-03-08 ENCOUNTER — OFFICE (AMBULATORY)
Dept: URBAN - METROPOLITAN AREA PATHOLOGY 4 | Facility: PATHOLOGY | Age: 44
End: 2024-03-08

## 2024-03-08 DIAGNOSIS — K51.00 ULCERATIVE (CHRONIC) PANCOLITIS WITHOUT COMPLICATIONS: ICD-10-CM

## 2024-03-08 DIAGNOSIS — K52.9 NONINFECTIVE GASTROENTERITIS AND COLITIS, UNSPECIFIED: ICD-10-CM

## 2024-03-08 DIAGNOSIS — K29.50 UNSPECIFIED CHRONIC GASTRITIS WITHOUT BLEEDING: ICD-10-CM

## 2024-03-08 DIAGNOSIS — K63.89 OTHER SPECIFIED DISEASES OF INTESTINE: ICD-10-CM

## 2024-03-08 DIAGNOSIS — Z86.010 PERSONAL HISTORY OF COLONIC POLYPS: ICD-10-CM

## 2024-03-08 DIAGNOSIS — R10.13 EPIGASTRIC PAIN: ICD-10-CM

## 2024-03-08 DIAGNOSIS — Z09 ENCOUNTER FOR FOLLOW-UP EXAMINATION AFTER COMPLETED TREATMEN: ICD-10-CM

## 2024-03-08 LAB
GI HISTOLOGY: A. SECOND PART OF THE DUODENUM: (no result)
GI HISTOLOGY: B. STOMACH ANTRUM: (no result)
GI HISTOLOGY: C. TERMINAL ILEUM: (no result)
GI HISTOLOGY: D. ASCENDING COLON: (no result)
GI HISTOLOGY: E. TRANSVERSE COLON: (no result)
GI HISTOLOGY: F. DESCENDING COLON: (no result)
GI HISTOLOGY: G. SIGMOID COLON: (no result)
GI HISTOLOGY: H. RECTUM: (no result)
GI HISTOLOGY: PDF REPORT: (no result)

## 2024-03-08 PROCEDURE — 88305 TISSUE EXAM BY PATHOLOGIST: CPT | Performed by: PATHOLOGY

## 2024-03-08 PROCEDURE — 45380 COLONOSCOPY AND BIOPSY: CPT | Mod: 33 | Performed by: INTERNAL MEDICINE

## 2024-03-08 PROCEDURE — 43239 EGD BIOPSY SINGLE/MULTIPLE: CPT | Performed by: INTERNAL MEDICINE

## 2024-03-08 PROCEDURE — 88342 IMHCHEM/IMCYTCHM 1ST ANTB: CPT | Performed by: PATHOLOGY

## 2024-05-30 ENCOUNTER — OFFICE (AMBULATORY)
Dept: URBAN - METROPOLITAN AREA CLINIC 76 | Facility: CLINIC | Age: 44
End: 2024-05-30

## 2024-05-30 VITALS
OXYGEN SATURATION: 97 % | SYSTOLIC BLOOD PRESSURE: 121 MMHG | WEIGHT: 142 LBS | HEIGHT: 67 IN | HEART RATE: 67 BPM | DIASTOLIC BLOOD PRESSURE: 89 MMHG

## 2024-05-30 DIAGNOSIS — Z86.010 PERSONAL HISTORY OF COLONIC POLYPS: ICD-10-CM

## 2024-05-30 DIAGNOSIS — K51.00 ULCERATIVE (CHRONIC) PANCOLITIS WITHOUT COMPLICATIONS: ICD-10-CM

## 2024-05-30 DIAGNOSIS — R10.13 EPIGASTRIC PAIN: ICD-10-CM

## 2024-05-30 DIAGNOSIS — R10.31 RIGHT LOWER QUADRANT PAIN: ICD-10-CM

## 2024-05-30 DIAGNOSIS — K58.9 IRRITABLE BOWEL SYNDROME WITHOUT DIARRHEA: ICD-10-CM

## 2024-05-30 DIAGNOSIS — R10.32 LEFT LOWER QUADRANT PAIN: ICD-10-CM

## 2024-05-30 PROCEDURE — 99213 OFFICE O/P EST LOW 20 MIN: CPT | Performed by: INTERNAL MEDICINE

## 2024-05-30 RX ORDER — MESALAMINE 4 G/60ML
ENEMA RECTAL
Qty: 30 | Refills: 2 | Status: ACTIVE
Start: 2024-05-30

## 2024-05-30 RX ORDER — BETHANECHOL CHLORIDE 10 MG/1
30 TABLET ORAL
Qty: 60 | Refills: 5 | Status: ACTIVE